# Patient Record
Sex: FEMALE | URBAN - METROPOLITAN AREA
[De-identification: names, ages, dates, MRNs, and addresses within clinical notes are randomized per-mention and may not be internally consistent; named-entity substitution may affect disease eponyms.]

---

## 2018-07-10 ENCOUNTER — APPOINTMENT (RX ONLY)
Dept: URBAN - METROPOLITAN AREA CLINIC 26 | Facility: CLINIC | Age: 38
Setting detail: DERMATOLOGY
End: 2018-07-10

## 2018-07-10 DIAGNOSIS — L57.8 OTHER SKIN CHANGES DUE TO CHRONIC EXPOSURE TO NONIONIZING RADIATION: ICD-10-CM

## 2018-07-10 DIAGNOSIS — L81.4 OTHER MELANIN HYPERPIGMENTATION: ICD-10-CM

## 2018-07-10 DIAGNOSIS — D18.0 HEMANGIOMA: ICD-10-CM

## 2018-07-10 DIAGNOSIS — Z80.8 FAMILY HISTORY OF MALIGNANT NEOPLASM OF OTHER ORGANS OR SYSTEMS: ICD-10-CM

## 2018-07-10 DIAGNOSIS — D22 MELANOCYTIC NEVI: ICD-10-CM

## 2018-07-10 DIAGNOSIS — D485 NEOPLASM OF UNCERTAIN BEHAVIOR OF SKIN: ICD-10-CM

## 2018-07-10 PROBLEM — D22.5 MELANOCYTIC NEVI OF TRUNK: Status: ACTIVE | Noted: 2018-07-10

## 2018-07-10 PROBLEM — D18.01 HEMANGIOMA OF SKIN AND SUBCUTANEOUS TISSUE: Status: ACTIVE | Noted: 2018-07-10

## 2018-07-10 PROBLEM — D48.5 NEOPLASM OF UNCERTAIN BEHAVIOR OF SKIN: Status: ACTIVE | Noted: 2018-07-10

## 2018-07-10 PROCEDURE — ? COUNSELING

## 2018-07-10 PROCEDURE — ? BIOPSY BY SHAVE METHOD

## 2018-07-10 PROCEDURE — 11100: CPT

## 2018-07-10 PROCEDURE — 99214 OFFICE O/P EST MOD 30 MIN: CPT | Mod: 25

## 2018-07-10 ASSESSMENT — LOCATION ZONE DERM
LOCATION ZONE: TRUNK
LOCATION ZONE: ARM

## 2018-07-10 ASSESSMENT — LOCATION SIMPLE DESCRIPTION DERM
LOCATION SIMPLE: RIGHT FOREARM
LOCATION SIMPLE: LEFT FOREARM
LOCATION SIMPLE: ABDOMEN

## 2018-07-10 ASSESSMENT — LOCATION DETAILED DESCRIPTION DERM
LOCATION DETAILED: EPIGASTRIC SKIN
LOCATION DETAILED: RIGHT DISTAL RADIAL DORSAL FOREARM
LOCATION DETAILED: LEFT VENTRAL PROXIMAL FOREARM
LOCATION DETAILED: PERIUMBILICAL SKIN
LOCATION DETAILED: RIGHT VENTRAL PROXIMAL FOREARM

## 2018-07-10 NOTE — PROCEDURE: BIOPSY BY SHAVE METHOD
Electrodesiccation Text: The wound bed was treated with electrodesiccation after the biopsy was performed.
Anesthesia Type: 1% lidocaine with epinephrine
Cryotherapy Text: The wound bed was treated with cryotherapy after the biopsy was performed.
Bill For Surgical Tray: no
Detail Level: Detailed
Electrodesiccation And Curettage Text: The wound bed was treated with electrodesiccation and curettage after the biopsy was performed.
Anesthesia Volume In Cc (Will Not Render If 0): 0.5
Dressing: bandage
Biopsy Method: 15 blade
Consent: Written consent was obtained and risks were reviewed including but not limited to scarring, infection, bleeding, scabbing, incomplete removal, nerve damage and allergy to anesthesia.
Billing Type: Third-Party Bill
Post-Care Instructions: I reviewed with the patient in detail post-care instructions. Patient is to keep the biopsy site dry overnight, and then apply bacitracin twice daily until healed. Patient may apply hydrogen peroxide soaks to remove any crusting.
Type Of Destruction Used: Curettage
Notification Instructions: Patient will be notified of biopsy results. However, patient instructed to call the office if not contacted within 2 weeks.
X Size Of Lesion In Cm: 0
Biopsy Type: H and E
Silver Nitrate Text: The wound bed was treated with silver nitrate after the biopsy was performed.
Was A Bandage Applied: Yes
Wound Care: Petrolatum
Curettage Text: The wound bed was treated with curettage after the biopsy was performed.
Depth Of Biopsy: dermis
Hemostasis: Drysol

## 2021-01-25 ENCOUNTER — APPOINTMENT (EMERGENCY)
Dept: RADIOLOGY | Facility: HOSPITAL | Age: 41
End: 2021-01-25
Attending: EMERGENCY MEDICINE
Payer: COMMERCIAL

## 2021-01-25 ENCOUNTER — HOSPITAL ENCOUNTER (EMERGENCY)
Facility: HOSPITAL | Age: 41
Discharge: HOME | End: 2021-01-25
Attending: EMERGENCY MEDICINE
Payer: COMMERCIAL

## 2021-01-25 ENCOUNTER — APPOINTMENT (EMERGENCY)
Dept: RADIOLOGY | Facility: HOSPITAL | Age: 41
End: 2021-01-25
Payer: COMMERCIAL

## 2021-01-25 VITALS
DIASTOLIC BLOOD PRESSURE: 52 MMHG | OXYGEN SATURATION: 98 % | RESPIRATION RATE: 19 BRPM | HEART RATE: 87 BPM | WEIGHT: 175 LBS | TEMPERATURE: 97.7 F | HEIGHT: 70 IN | SYSTOLIC BLOOD PRESSURE: 148 MMHG | BODY MASS INDEX: 25.05 KG/M2

## 2021-01-25 DIAGNOSIS — R79.89 ABNORMAL LFTS: ICD-10-CM

## 2021-01-25 DIAGNOSIS — R11.2 NAUSEA AND VOMITING, INTRACTABILITY OF VOMITING NOT SPECIFIED, UNSPECIFIED VOMITING TYPE: ICD-10-CM

## 2021-01-25 DIAGNOSIS — K76.0 FATTY LIVER: ICD-10-CM

## 2021-01-25 DIAGNOSIS — E80.6 DIRECT HYPERBILIRUBINEMIA: ICD-10-CM

## 2021-01-25 DIAGNOSIS — E83.42 HYPOMAGNESEMIA: Primary | ICD-10-CM

## 2021-01-25 DIAGNOSIS — D72.829 LEUKOCYTOSIS, UNSPECIFIED TYPE: ICD-10-CM

## 2021-01-25 LAB
ALBUMIN SERPL-MCNC: 3.5 G/DL (ref 3.4–5)
ALP SERPL-CCNC: 171 IU/L (ref 35–126)
ALT SERPL-CCNC: 29 IU/L (ref 11–54)
ANION GAP SERPL CALC-SCNC: 15 MEQ/L (ref 3–15)
AST SERPL-CCNC: 120 IU/L (ref 15–41)
B-HCG UR QL: NEGATIVE
BACTERIA URNS QL MICRO: 1 /HPF
BASOPHILS # BLD: 0.12 K/UL (ref 0.01–0.1)
BASOPHILS NFR BLD: 0.8 %
BILIRUB DIRECT SERPL-MCNC: 2.2 MG/DL
BILIRUB SERPL-MCNC: 5.5 MG/DL (ref 0.3–1.2)
BILIRUB UR QL STRIP.AUTO: 1 MG/DL
BUN SERPL-MCNC: 7 MG/DL (ref 8–20)
CALCIUM SERPL-MCNC: 9.3 MG/DL (ref 8.9–10.3)
CHLORIDE SERPL-SCNC: 95 MEQ/L (ref 98–109)
CLARITY UR REFRACT.AUTO: CLEAR
CO2 SERPL-SCNC: 27 MEQ/L (ref 22–32)
COLOR UR AUTO: ABNORMAL
CREAT SERPL-MCNC: 0.8 MG/DL (ref 0.6–1.1)
DIFFERENTIAL METHOD BLD: ABNORMAL
EOSINOPHIL # BLD: 0.07 K/UL (ref 0.04–0.36)
EOSINOPHIL NFR BLD: 0.5 %
ERYTHROCYTE [DISTWIDTH] IN BLOOD BY AUTOMATED COUNT: 15.9 % (ref 11.7–14.4)
GFR SERPL CREATININE-BSD FRML MDRD: >60 ML/MIN/1.73M*2
GLUCOSE SERPL-MCNC: 135 MG/DL (ref 70–99)
GLUCOSE UR STRIP.AUTO-MCNC: NEGATIVE MG/DL
HCT VFR BLDCO AUTO: 43.3 % (ref 35–45)
HGB BLD-MCNC: 14.9 G/DL (ref 11.8–15.7)
HGB UR QL STRIP.AUTO: NEGATIVE
HYALINE CASTS #/AREA URNS LPF: ABNORMAL /LPF
IMM GRANULOCYTES # BLD AUTO: 0.03 K/UL (ref 0–0.08)
IMM GRANULOCYTES NFR BLD AUTO: 0.2 %
KETONES UR STRIP.AUTO-MCNC: NEGATIVE MG/DL
LEUKOCYTE ESTERASE UR QL STRIP.AUTO: NEGATIVE
LIPASE SERPL-CCNC: 23 U/L (ref 20–51)
LYMPHOCYTES # BLD: 3.56 K/UL (ref 1.2–3.5)
LYMPHOCYTES NFR BLD: 23.5 %
MAGNESIUM SERPL-MCNC: 1.2 MG/DL (ref 1.8–2.5)
MCH RBC QN AUTO: 35.7 PG (ref 28–33.2)
MCHC RBC AUTO-ENTMCNC: 34.4 G/DL (ref 32.2–35.5)
MCV RBC AUTO: 103.8 FL (ref 83–98)
MONOCYTES # BLD: 1.46 K/UL (ref 0.28–0.8)
MONOCYTES NFR BLD: 9.6 %
NEUTROPHILS # BLD: 9.9 K/UL (ref 1.7–7)
NEUTS SEG NFR BLD: 65.4 %
NITRITE UR QL STRIP.AUTO: NEGATIVE
NRBC BLD-RTO: 0 %
PDW BLD AUTO: 11.6 FL (ref 9.4–12.3)
PH UR STRIP.AUTO: 8.5 [PH]
PLATELET # BLD AUTO: 142 K/UL (ref 150–369)
POCT TEST: NORMAL
POTASSIUM SERPL-SCNC: 3.5 MEQ/L (ref 3.6–5.1)
PROT SERPL-MCNC: 8.1 G/DL (ref 6–8.2)
PROT UR QL STRIP.AUTO: 1
RBC # BLD AUTO: 4.17 M/UL (ref 3.93–5.22)
RBC #/AREA URNS HPF: ABNORMAL /HPF
SODIUM SERPL-SCNC: 137 MEQ/L (ref 136–144)
SP GR UR REFRACT.AUTO: >1.035
SQUAMOUS URNS QL MICRO: 1 /HPF
UROBILINOGEN UR STRIP-ACNC: 2 EU/DL
WBC # BLD AUTO: 15.14 K/UL (ref 3.8–10.5)
WBC #/AREA URNS HPF: ABNORMAL /HPF

## 2021-01-25 PROCEDURE — 74177 CT ABD & PELVIS W/CONTRAST: CPT | Mod: MG

## 2021-01-25 PROCEDURE — 85025 COMPLETE CBC W/AUTO DIFF WBC: CPT

## 2021-01-25 PROCEDURE — G1004 CDSM NDSC: HCPCS

## 2021-01-25 PROCEDURE — 83690 ASSAY OF LIPASE: CPT | Performed by: EMERGENCY MEDICINE

## 2021-01-25 PROCEDURE — 83690 ASSAY OF LIPASE: CPT

## 2021-01-25 PROCEDURE — 63600000 HC DRUGS/DETAIL CODE: Performed by: PHYSICIAN ASSISTANT

## 2021-01-25 PROCEDURE — 96361 HYDRATE IV INFUSION ADD-ON: CPT

## 2021-01-25 PROCEDURE — 99284 EMERGENCY DEPT VISIT MOD MDM: CPT | Mod: 25

## 2021-01-25 PROCEDURE — 80053 COMPREHEN METABOLIC PANEL: CPT | Performed by: EMERGENCY MEDICINE

## 2021-01-25 PROCEDURE — 76705 ECHO EXAM OF ABDOMEN: CPT

## 2021-01-25 PROCEDURE — 82248 BILIRUBIN DIRECT: CPT | Performed by: PHYSICIAN ASSISTANT

## 2021-01-25 PROCEDURE — A9585 GADOBUTROL INJECTION: HCPCS | Performed by: PHYSICIAN ASSISTANT

## 2021-01-25 PROCEDURE — 3E033GC INTRODUCTION OF OTHER THERAPEUTIC SUBSTANCE INTO PERIPHERAL VEIN, PERCUTANEOUS APPROACH: ICD-10-PCS | Performed by: EMERGENCY MEDICINE

## 2021-01-25 PROCEDURE — 63600105 HC IODINE BASED CONTRAST: Mod: JW | Performed by: PHYSICIAN ASSISTANT

## 2021-01-25 PROCEDURE — 85025 COMPLETE CBC W/AUTO DIFF WBC: CPT | Performed by: EMERGENCY MEDICINE

## 2021-01-25 PROCEDURE — 83735 ASSAY OF MAGNESIUM: CPT | Performed by: PHYSICIAN ASSISTANT

## 2021-01-25 PROCEDURE — A9579 GAD-BASE MR CONTRAST NOS,1ML: HCPCS | Performed by: PHYSICIAN ASSISTANT

## 2021-01-25 PROCEDURE — 25000000 HC PHARMACY GENERAL: Performed by: PHYSICIAN ASSISTANT

## 2021-01-25 PROCEDURE — 96375 TX/PRO/DX INJ NEW DRUG ADDON: CPT

## 2021-01-25 PROCEDURE — 96365 THER/PROPH/DIAG IV INF INIT: CPT

## 2021-01-25 PROCEDURE — 81001 URINALYSIS AUTO W/SCOPE: CPT | Performed by: EMERGENCY MEDICINE

## 2021-01-25 PROCEDURE — 36415 COLL VENOUS BLD VENIPUNCTURE: CPT

## 2021-01-25 PROCEDURE — 96366 THER/PROPH/DIAG IV INF ADDON: CPT

## 2021-01-25 PROCEDURE — 25800000 HC PHARMACY IV SOLUTIONS: Performed by: PHYSICIAN ASSISTANT

## 2021-01-25 PROCEDURE — 80053 COMPREHEN METABOLIC PANEL: CPT

## 2021-01-25 PROCEDURE — 3E0337Z INTRODUCTION OF ELECTROLYTIC AND WATER BALANCE SUBSTANCE INTO PERIPHERAL VEIN, PERCUTANEOUS APPROACH: ICD-10-PCS | Performed by: EMERGENCY MEDICINE

## 2021-01-25 RX ORDER — SIMVASTATIN 20 MG/1
20 TABLET, FILM COATED ORAL
COMMUNITY
Start: 2020-12-20 | End: 2024-11-23 | Stop reason: ALTCHOICE

## 2021-01-25 RX ORDER — METOPROLOL SUCCINATE 50 MG/1
50 TABLET, EXTENDED RELEASE ORAL
COMMUNITY
Start: 2020-12-20

## 2021-01-25 RX ORDER — CETIRIZINE HYDROCHLORIDE 10 MG/1
10 TABLET ORAL DAILY
COMMUNITY

## 2021-01-25 RX ORDER — FAMOTIDINE 10 MG/ML
20 INJECTION INTRAVENOUS ONCE
Status: COMPLETED | OUTPATIENT
Start: 2021-01-25 | End: 2021-01-25

## 2021-01-25 RX ORDER — GADOBUTROL 604.72 MG/ML
8 INJECTION INTRAVENOUS ONCE
Status: COMPLETED | OUTPATIENT
Start: 2021-01-25 | End: 2021-01-25

## 2021-01-25 RX ORDER — ONDANSETRON HYDROCHLORIDE 2 MG/ML
4 INJECTION, SOLUTION INTRAVENOUS ONCE
Status: COMPLETED | OUTPATIENT
Start: 2021-01-25 | End: 2021-01-25

## 2021-01-25 RX ADMIN — IOHEXOL 80 ML: 350 INJECTION, SOLUTION INTRAVENOUS at 11:20

## 2021-01-25 RX ADMIN — FAMOTIDINE 20 MG: 10 INJECTION INTRAVENOUS at 08:38

## 2021-01-25 RX ADMIN — GADOBUTROL 8 ML: 604.72 INJECTION INTRAVENOUS at 18:35

## 2021-01-25 RX ADMIN — MAGNESIUM SULFATE 2 G: 2 INJECTION INTRAVENOUS at 09:46

## 2021-01-25 RX ADMIN — ONDANSETRON HYDROCHLORIDE 4 MG: 2 SOLUTION INTRAMUSCULAR; INTRAVENOUS at 08:38

## 2021-01-25 RX ADMIN — SODIUM CHLORIDE 1000 ML: 9 INJECTION, SOLUTION INTRAVENOUS at 08:37

## 2021-01-25 SDOH — HEALTH STABILITY: MENTAL HEALTH: HOW OFTEN DO YOU HAVE A DRINK CONTAINING ALCOHOL?: 2-3 TIMES A WEEK

## 2021-01-25 ASSESSMENT — ENCOUNTER SYMPTOMS
DIAPHORESIS: 1
CHILLS: 1
LIGHT-HEADEDNESS: 0
SHORTNESS OF BREATH: 0
WEAKNESS: 0
DIARRHEA: 1
VOMITING: 1
HEMATURIA: 0
DYSURIA: 0
CONSTIPATION: 0
COUGH: 0
FEVER: 0
COLOR CHANGE: 0
DIZZINESS: 0
ABDOMINAL PAIN: 1
HEADACHES: 1
NAUSEA: 1
FREQUENCY: 0

## 2021-01-25 NOTE — ED PROVIDER NOTES
HPI     Chief Complaint   Patient presents with   • Nausea   • Vomiting   • Diarrhea   • Abdominal Pain       41-year-old female with PMH of HTN, HL, and IBS presents emergency department for nausea, vomiting, and diarrhea x1 day.  Patient states that the nausea, vomiting, diarrhea started at 11 AM yesterday.  Patient states that the diarrhea resolved approximately in 2 hours but the nausea vomiting persist.  Patient admits to approximately 25 episodes of vomiting over the last day.  Patient denies any melena emesis, hematochezia, or melena.  Patient states she has had similar symptoms in the past when she is had an IBS flare but it has never been as severe.  Patient has been unable to tolerate anything p.o.  Patient admits to abdominal discomfort just superior to the umbilicus that started after the nausea vomiting and feels as if there is a rock there.  Patient states the pain currently is resolved.  Patient denies any diarrhea, constipation, hematochezia, melena, dysuria, urinary frequency, urinary urgency, or hematuria.  Patient denies any chest pain, shortness of breath, lightheadedness, dizziness, or fevers.  Patient does admit to intermittent diaphoresis and chills over the last day.  Patient admits to current generalized headache that started after the nausea and vomiting.  Patient states that similar to headache she has had in the past.  Patient admits to drinking 2 alcoholic beverages approximately 4-5 times a week.      History provided by:  Patient   used: No    Vomiting  Associated symptoms: abdominal pain, chills, diarrhea and headaches    Associated symptoms: no cough and no fever    Diarrhea  Associated symptoms: abdominal pain, chills, diaphoresis, headaches and vomiting    Associated symptoms: no fever    Abdominal Pain  Associated symptoms: chills, diarrhea, nausea and vomiting    Associated symptoms: no chest pain, no constipation, no cough, no dysuria, no fever, no hematuria  and no shortness of breath         Patient History     Past Medical History:   Diagnosis Date   • Hypertension    • Lipid disorder        History reviewed. No pertinent surgical history.    History reviewed. No pertinent family history.    Social History     Tobacco Use   • Smoking status: Former Smoker   • Smokeless tobacco: Never Used   Substance Use Topics   • Alcohol use: Not Currently     Frequency: 2-3 times a week   • Drug use: Not Currently       Systems Reviewed from Nursing Triage:          Review of Systems     Review of Systems   Constitutional: Positive for chills and diaphoresis. Negative for fever.   HENT: Negative for nosebleeds.    Eyes: Negative for visual disturbance.   Respiratory: Negative for cough and shortness of breath.    Cardiovascular: Negative for chest pain.   Gastrointestinal: Positive for abdominal pain, diarrhea, nausea and vomiting. Negative for constipation.   Genitourinary: Negative for dysuria, frequency and hematuria.   Skin: Negative for color change and rash.   Neurological: Positive for headaches. Negative for dizziness, syncope, weakness and light-headedness.   All other systems reviewed and are negative.       Physical Exam     ED Vitals    Date/Time Temp Pulse Resp BP SpO2 Who   01/25/21 2129 -- 87 19 148/52 98 % MTM   01/25/21 2000 -- 83 18 149/62 100 % LK   01/25/21 1700 -- 75 16 131/61 97 % HH   01/25/21 1700 -- 76 16 131/61 97 % [facility]   01/25/21 1403 -- -- 17 148/65 97 % [facility]   01/25/21 1120 -- 88 16 140/73 98 % [facility]   01/25/21 1007 -- 82 18 144/68 97 % [facility]   01/25/21 0845 -- 77 19 143/67 97 % [facility]   01/25/21 0728 -- 88 17 124/62 98 % [facility]   01/25/21 0621 36.5 °C (97.7 °F) 91 16 121/57 96 % MM          Pulse Ox %: 96 % (01/25/21 0728)  Pulse Ox Interpretation: Normal (01/25/21 0728)  Heart Rate: 91 (01/25/21 0728)  Rhythm Strip Interpretation: Normal Sinus Rhythm (01/25/21 0728)                                       Physical Exam  Vitals signs and nursing note reviewed.    Constitutional:       General: She is not in acute distress.     Appearance: She is well-developed. She is obese. She is not ill-appearing or diaphoretic.   HENT:      Head: Normocephalic and atraumatic.   Eyes:      Extraocular Movements: Extraocular movements intact.      Conjunctiva/sclera: Conjunctivae normal.   Neck:      Musculoskeletal: Normal range of motion.   Cardiovascular:      Rate and Rhythm: Normal rate and regular rhythm.      Heart sounds: Normal heart sounds.   Pulmonary:      Effort: Pulmonary effort is normal. No respiratory distress.      Breath sounds: Normal breath sounds. No decreased breath sounds, wheezing, rhonchi or rales.   Abdominal:      General: Abdomen is flat. Bowel sounds are normal.      Palpations: Abdomen is soft.      Tenderness: There is no abdominal tenderness. There is no right CVA tenderness, left CVA tenderness, guarding or rebound.   Musculoskeletal: Normal range of motion.   Skin:     General: Skin is warm and dry.      Capillary Refill: Capillary refill takes less than 2 seconds.   Neurological:      Mental Status: She is alert.              Procedures    Results     Procedure Component Value Units Date/Time    UA with reflex culture [[id number]]  (Abnormal) Collected: 01/25/21 2123    Specimen: Urine, Clean Catch Updated: 01/25/21 2228    Narrative:      The following orders were created for panel order UA with reflex culture.  Procedure                               Abnormality         Status                     ---------                               -----------         ------                     UA Reflex to Culture (Ma...[[id number]]  Abnormal            Final result               UA Microscopic[[id number]]               Abnormal            Final result                 Please view results for these tests on the individual orders.    UA Microscopic [[id number]]  (Abnormal) Collected: 01/25/21 2123    Specimen: Urine, Clean Catch Updated: 01/25/21 2228     Squamous  Epithelial +1 /hpf      Bacteria, Urine +1 /HPF      RBC, Urine 0 TO 4 /HPF      WBC, Urine 0 TO 3 /HPF      Hyaline Casts 0 TO 2 /lpf     UA Reflex to Culture (Macroscopic) [[id number]]  (Abnormal) Collected: 01/25/21 2123    Specimen: Urine, Clean Catch Updated: 01/25/21 2155     Color, Urine Cassandra     Clarity, Urine Clear     Specific Gravity, Urine >1.035     pH, Urine 8.5     Leukocyte Esterase Negative     Comment: Results can be falsely negative due to high specific gravity, some antibiotics, glucose >3 g/dl, or WBC other than neutrophils.        Nitrite, Urine Negative     Protein, Urine +1     Comment: Urinary pH above 8.0 may cause false positive protein.        Glucose, Urine Negative mg/dL      Ketones, Urine Negative mg/dL      Urobilinogen, Urine 2.0 EU/dL      Bilirubin, Urine +1 mg/dL      Blood, Urine Negative     Comment: The sensitivity of the occult blood test is equivalent to approximately 4 intact RBC/HPF.       Magnesium [[id number]]  (Abnormal) Collected: 01/25/21 0657    Specimen: Blood, Venous Updated: 01/25/21 0902     Magnesium 1.2 mg/dL     Bilirubin, direct [[id number]]  (Abnormal) Collected: 01/25/21 0657    Specimen: Blood, Venous Updated: 01/25/21 0902     Bilirubin, Direct 2.2 mg/dL     CBC and differential [[id number]]  (Abnormal) Collected: 01/25/21 0657    Specimen: Blood, Venous Updated: 01/25/21 0755     WBC 15.14 K/uL      RBC 4.17 M/uL      Hemoglobin 14.9 g/dL      Hematocrit 43.3 %      .8 fL      MCH 35.7 pg      MCHC 34.4 g/dL      RDW 15.9 %      Platelets 142 K/uL      MPV 11.6 fL      Differential Type Auto     nRBC 0.0 %      Immature Granulocytes 0.2 %      Neutrophils 65.4 %      Lymphocytes 23.5 %      Monocytes 9.6 %      Eosinophils 0.5 %      Basophils 0.8 %      Immature Granulocytes, Absolute 0.03 K/uL      Neutrophils, Absolute 9.90 K/uL      Lymphocytes, Absolute 3.56 K/uL      Monocytes, Absolute 1.46 K/uL      Eosinophils, Absolute 0.07 K/uL       Basophils, Absolute 0.12 K/uL     Comprehensive metabolic panel [[id number]]  (Abnormal) Collected: 01/25/21 0657    Specimen: Blood, Venous Updated: 01/25/21 0746     Sodium 137 mEQ/L      Potassium 3.5 mEQ/L      Comment: Results obtained on plasma. Plasma Potassium values may be up to 0.4 mEQ/L less than serum values. The differences may be greater for patients with high platelet or white cell counts.        Chloride 95 mEQ/L      CO2 27 mEQ/L      BUN 7 mg/dL      Creatinine 0.8 mg/dL      Glucose 135 mg/dL      Calcium 9.3 mg/dL      AST (SGOT) 120 IU/L      ALT (SGPT) 29 IU/L      Alkaline Phosphatase 171 IU/L      Total Protein 8.1 g/dL      Comment: Test performed on plasma which typically contains approximately 0.4 g/dL more protein than serum.        Albumin 3.5 g/dL      Bilirubin, Total 5.5 mg/dL      eGFR >60.0 mL/min/1.73m*2      Anion Gap 15 mEQ/L     Lipase [[id number]]  (Normal) Collected: 01/25/21 0657    Specimen: Blood, Venous Updated: 01/25/21 0746     Lipase 23 U/L           Imaging Results          MRI ABDOMEN WITH AND WITHOUT CONTRAST (Final result)  Result time 01/25/21 21:03:11   Procedure changed from MRI ABDOMEN WITHOUT CONTRAST     Final result                 Impression:    IMPRESSION: Limited study.  Hepatomegaly, heterogeneous hepatic enhancement, with masslike areas of hepatic  steatosis..    COMMENT: MRI of the abdomen was performed utilizing axial T1, fat-suppressed T1,  T2, and fat-suppressed T2-weighted sequences. These are supplemented by axial  T1-weighted gradient echo sequences in and out of phase and coronal T2 weighted  sequences. Axial diffusion sequences were also performed. Following the  intravenous injection of 8 cc of Gadavist, axial fat-suppressed T1-weighted  sequences through the abdomen were performed in the arterial and portal venous  phases and at approximately 3 minutes. A delayed post-contrast fat-suppressed  coronal T1 weighted sequence was also performed.   Study is markedly limited by  motion.    Comparison: Ultrasound of the right upper quadrant and CT scan the abdomen from  earlier in the day.    The lung bases appear grossly unremarkable.  The heart does not appear enlarged.    The liver is enlarged measuring at least 23.3 cm in craniocaudal dimension.  The  signal throughout the liver is mildly heterogeneous, particularly on the  postcontrast images which may be due to hepatic steatosis, steatohepatitis,  and/or fibrosis.  In addition, there are large, masslike areas of signal loss on  the opposed phase sequence particularly in the lateral segment of the left lobe,  posterior aspect of the medial segment of the left lobe, inferior aspect of the  right lobe, and caudate lobe, which also demonstrate signal loss on the  fat-suppressed T1 and fat-suppressed T2-weighted sequences and are mildly bright  on the fat-suppressed post of thick sequences.  These do not restrict diffusion.  There are normal vessels coursing through these areas.  No definite abnormal  enhancement is seen in these areas.  These are felt to represent masslike areas  of hepatic steatosis.    The gallbladder is grossly unremarkable.  No biliary dilatation is seen.    The spleen, pancreas, kidneys, and adrenal glands appear unremarkable..    No upper abdominal lymphadenopathy or free fluid is seen.  No grossly abnormal  bowel loops are identified.    The signal in the osseous structures is grossly unremarkable.             Narrative:    CLINICAL HISTORY: Abnormal liver function tests; abnormal ultrasound and CT                               CT ABDOMEN PELVIS WITH IV CONTRAST (Final result)  Result time 01/25/21 11:57:08    Final result                 Impression:    IMPRESSION:  The liver is markedly enlarged and heterogeneous in echotexture demonstrating  two large poorly defined masslike areas in the left hepatic lobe and right  hepatic lobe but without distortion of the underlying  vasculature.  Additionally, no intrahepatic ductal dilatation is noted.  Recommend 3T MRI  liver for definitive characterization, favor atypical appearance of hepatic  steatosis although other etiologies are not excluded.        Finding:    Other   Acuity: Significant  Status:  CLOSED    Critical read back was performed and results were read back by MARBELLA WALKER,  1/25/2021 11:56 AM.                   Narrative:      CLINICAL HISTORY: Epigastric pain, elevated bilirubin    COMMENT:  TECHNIQUE:  Axial images were obtained through the abdomen and pelvis after IV  and oral without contrast.  Sagittal and coronal reconstructions were performed.      Comparison: Ultrasound performed earlier the same day  CT DOSE:  One or more dose reduction techniques (e.g. automated exposure  control, adjustment of the mA and/or kV according to patient size, use of  iterative reconstruction technique) utilized for this examination.    The lung bases are clear.  There are no pleural effusions.  The liver is  markedly abnormal, enlarged and demonstrating several large poorly defined  masses.  No intrahepatic ductal dilatation is noted.  The spleen, gallbladder,  pancreas, small and large bowel are unremarkable.  The adrenal glands are normal  in size.  Both kidneys enhance symmetrically.  There is no hydronephrosis.  No  free fluid or free air is seen.  Small nevaeh hepatic lymph nodes are seen.  No  enlarged adenopathy is noted.    Multiple subcutaneous calcified nodules are noted.  Uterus is present with an  IUD.  Small right ovarian follicle noted..  Bladder is normal.  The vascular and  osseous structures are intact.                               ULTRASOUND GALLBLADDER (Final result)  Result time 01/25/21 10:47:38    Final result                 Impression:    IMPRESSION:      1.  No evidence for cholelithiasis, acute cholecystitis,  or biliary ductal  dilatation.  Mild gallbladder sludge.    2.  Hepatomegaly and heterogeneity of  the hepatic parenchyma, a finding which  can be seen in the setting of diffuse hepatic steatosis.  Due to the  heterogeneity, I would recommend further assessment with contrast-enhanced CT or  MR to exclude any underlying mass lesions.    3.  Incidental note is made of debris within the stomach.  The patient reports  that she did not eat recently.  This can be seen with gastric obstruction.  Further assessment with CT as clinically appropriate.             Narrative:      CLINICAL HISTORY:  Nausea/vomiting-- elevated bilirubin    COMMENT: A limited abdominal ultrasound with attention to the right upper  quadrant is performed.  Grayscale and color Doppler imaging is utilized.    COMPARISON: There are no prior examinations available for comparison.    Liver: The liver is enlarged.  The liver measures 24 cm in length.  There is  diffusely increased parenchymal echogenicity of finding which is most commonly  seen in the setting of hepatic steatosis.  The hepatic parenchyma is also  slightly heterogeneous, a finding that can be seen with certain types of hepatic  steatosis.  It would be difficult to entirely exclude a mass lesion.  Further  assessment with cross-sectional imaging recommended.    Biliary tree:  There is no intra or extra hepatic biliary duct dilatation with  the common bile duct measuring 4 mm.    Gallbladder:  No gallstones are seen within the gallbladder.  Minimal  gallbladder sludge.  There is no gallbladder wall thickening or pericholecystic  fluid identified. A positive sonographic Birch's sign is not elicited.    Pancreas:   The pancreatic head and body are normal.  The tail is suboptimally  visualized secondary to overlying bowel gas.    Right Kidney:  The right kidney measures 12.1 cm in length.  The kidney is  normal in echogenicity.  There is no evidence for hydronephrosis or renal  calculi.    Additional findings: Incidental note is made of debris within the stomach.  The  patient reports that  she did not eat recently.  This can be seen with gastric  obstruction.  Further assessment with CT as clinically appropriate.                                No orders to display               ED Course & MDM     MDM  Number of Diagnoses or Management Options  Diagnosis management comments: 41-year-old female with PMH of HTN, HL, and IBS presents emergency department for nausea, vomiting, and diarrhea x1 day.  Differential diagnosis is broad.  Will get basic labs, lipase, urine pregnancy, and UA.  Will give IV fluids, Zofran, and Pepcid then reevaluate.       Amount and/or Complexity of Data Reviewed  Clinical lab tests: reviewed             ED Course as of Jan 26 0717   Mon Jan 25, 2021   0841 WBC(!): 15.14 [KM]   0842 No previous for comparison.  Will get direct bilirubin level.   Bilirubin, Total(!): 5.5 [KM]   0842 Alkaline Phosphatase(!): 171 [KM]   0843 AST (SGOT)(!): 120 [KM]   0843 AST elevated.  Alk phos slightly elevated.  Total bilirubin elevated.  Will get ultrasound of gallbladder for further evaluation.  Abdomen nontender to palpation.    [KM]   0915 Bilirubin, Direct(!): 2.2 [KM]   0915 Will replete   Magnesium(!): 1.2 [KM]   0917 GI paged for consult    [KM]   0925 Spoke with GI.  Agree with the plan for ultrasound of gallbladder.  If it does not reveal anything acute will get CT abdomen/pelvis for further evaluation.    [KM]   1055 US of gallbladder reveals No evidence for cholelithiasis, acute cholecystitis,  or biliary ductal dilatation.  Mild gallbladder sludge. Hepatomegaly and heterogeneity of the hepatic parenchyma, a finding which can be seen in the setting of diffuse hepatic steatosis.  Incidental note is made of debris within the stomach.  The patient reports that she did not eat recently.  This can be seen with gastric obstruction.  Will get CT of abdomen/pelvis for further evaluation.    [KM]   1158 Attending discussed CT results with Dr. Collins recommended MRI for further evaluation.     [KM]   1533 CRITICAL CARE NOTE:    15:33  I have personally provided 45 minutes of critical care time exclusive of time spent on separately billable procedures. Time includes review of all data, discussion with consultants / admitting physicians, re-evaluation of patient, discussion with patient and/or family, review of all results, and monitoring for potential decompensation.    Critical hypomagnesium req IV replacement.       [TL]   1922 Patient signed out to [facility] pending MRI results. Will replete magnesium    [KM]   2030 Awaiting Mri result; I called rads attending, and he'll get to it as soon as he can. Unfortunately he has many critical studies to review first.    [JF]   2105 Pt refusd additional labs;  Per rads, no mass, but mass-like fatty linfiltration;  No ductal dilitaoint;    Rewvied w/ GI--okc'd for dc home; pt eager to leave; comfortable w/ plan    Educated re:cutting down etoh    Pt dc'd for close out-pt management. Doubt acute emergency at this time. Conditions for which immediate rted reviewed.          [JF]      ED Course User Index  [JF] Kevin Mcqueen MD  [KM] Keri Medina PA C  [TL] Phan Milton, DO         Clinical Impressions as of Jan 26 0717   Hypomagnesemia   Abnormal LFTs   Fatty liver   Direct hyperbilirubinemia   Leukocytosis, unspecified type   Nausea and vomiting, intractability of vomiting not specified, unspecified vomiting type        Keri Medina PA C  01/26/21 0717

## 2021-01-26 NOTE — DISCHARGE INSTRUCTIONS
RETURN IMMEDIATELY FOR ANY NEW OR WORSE SYMPTOMS;    REVIEW ANY LABS AND FINAL IMAGING RESULTS (SOMETIMES THESE MAY NOT BE BACK UNTIL TOMORROW)  WITH YOUR DOCTORS AT YOUR NEXT APPOINTMENT.    Arrange repeat liver enzymes for 1 week.    As discussed talk to your doctor about getting help to reduce your alcohol consumption.  This is very   important    Ask your gynecologist about whether you can still be on a Mirena, in light of your abnormal liver enzymes.  Discussed this with him or her this week.

## 2021-02-10 ENCOUNTER — APPOINTMENT (RX ONLY)
Dept: URBAN - METROPOLITAN AREA CLINIC 26 | Facility: CLINIC | Age: 41
Setting detail: DERMATOLOGY
End: 2021-02-10

## 2021-02-10 DIAGNOSIS — I78.8 OTHER DISEASES OF CAPILLARIES: ICD-10-CM

## 2021-02-10 DIAGNOSIS — I78.1 NEVUS, NON-NEOPLASTIC: ICD-10-CM

## 2021-02-10 DIAGNOSIS — L81.4 OTHER MELANIN HYPERPIGMENTATION: ICD-10-CM

## 2021-02-10 DIAGNOSIS — D22 MELANOCYTIC NEVI: ICD-10-CM

## 2021-02-10 PROBLEM — D48.5 NEOPLASM OF UNCERTAIN BEHAVIOR OF SKIN: Status: ACTIVE | Noted: 2021-02-10

## 2021-02-10 PROBLEM — D22.71 MELANOCYTIC NEVI OF RIGHT LOWER LIMB, INCLUDING HIP: Status: ACTIVE | Noted: 2021-02-10

## 2021-02-10 PROBLEM — D22.72 MELANOCYTIC NEVI OF LEFT LOWER LIMB, INCLUDING HIP: Status: ACTIVE | Noted: 2021-02-10

## 2021-02-10 PROCEDURE — ? COUNSELING

## 2021-02-10 PROCEDURE — ? PRESCRIPTION MEDICATION MANAGEMENT

## 2021-02-10 PROCEDURE — ? ADDITIONAL NOTES

## 2021-02-10 PROCEDURE — ? FULL BODY SKIN EXAM

## 2021-02-10 PROCEDURE — 99213 OFFICE O/P EST LOW 20 MIN: CPT

## 2021-02-10 PROCEDURE — ? TREATMENT REGIMEN

## 2021-02-10 ASSESSMENT — LOCATION SIMPLE DESCRIPTION DERM
LOCATION SIMPLE: ABDOMEN
LOCATION SIMPLE: LEFT CLAVICULAR SKIN
LOCATION SIMPLE: RIGHT PRETIBIAL REGION
LOCATION SIMPLE: LEFT FOREHEAD
LOCATION SIMPLE: LEFT PRETIBIAL REGION

## 2021-02-10 ASSESSMENT — LOCATION ZONE DERM
LOCATION ZONE: TRUNK
LOCATION ZONE: LEG
LOCATION ZONE: FACE

## 2021-02-10 ASSESSMENT — LOCATION DETAILED DESCRIPTION DERM
LOCATION DETAILED: EPIGASTRIC SKIN
LOCATION DETAILED: LEFT PROXIMAL PRETIBIAL REGION
LOCATION DETAILED: LEFT FOREHEAD
LOCATION DETAILED: RIGHT PROXIMAL PRETIBIAL REGION
LOCATION DETAILED: LEFT CLAVICULAR SKIN

## 2021-02-10 NOTE — PROCEDURE: ADDITIONAL NOTES
Detail Level: Simple
Additional Notes: Patient consent was obtained to proceed with the visit and recommended plan of care after discussion of all risks and benefits, including the risks of COVID-19 exposure.
Detail Level: Zone
Additional Notes: Discussed this can be related to issues with the liver. Pt is seeing GI and pcp and they are getting blood work done for her and evaluating so she is aware of the potential connection
Render Risk Assessment In Note?: no

## 2021-02-10 NOTE — HPI: EVALUATION OF SKIN LESION(S)
What Type Of Note Output Would You Prefer (Optional)?: Bullet Format
Hpi Title: Evaluation of Skin Lesions
Family Member: Father and Grandfather

## 2021-02-10 NOTE — PROCEDURE: PRESCRIPTION MEDICATION MANAGEMENT
Detail Level: Zone
Plan: Discussed laser
Render In Strict Bullet Format?: No
Samples Given: Rhofade qam to try

## 2022-04-28 ENCOUNTER — APPOINTMENT (RX ONLY)
Dept: URBAN - METROPOLITAN AREA CLINIC 26 | Facility: CLINIC | Age: 42
Setting detail: DERMATOLOGY
End: 2022-04-28

## 2022-04-28 DIAGNOSIS — L63.8 OTHER ALOPECIA AREATA: ICD-10-CM

## 2022-04-28 PROCEDURE — ? COUNSELING

## 2022-04-28 PROCEDURE — ? TREATMENT REGIMEN

## 2022-04-28 PROCEDURE — ? PRESCRIPTION

## 2022-04-28 PROCEDURE — ? PHOTO-DOCUMENTATION

## 2022-04-28 PROCEDURE — ? ADDITIONAL NOTES

## 2022-04-28 PROCEDURE — ? INTRALESIONAL KENALOG

## 2022-04-28 PROCEDURE — 99213 OFFICE O/P EST LOW 20 MIN: CPT | Mod: 25

## 2022-04-28 PROCEDURE — 11900 INJECT SKIN LESIONS </W 7: CPT

## 2022-04-28 PROCEDURE — ? OBSERVATION AND MEASURE

## 2022-04-28 RX ORDER — CLOBETASOL PROPIONATE 0.5 MG/ML
SOLUTION TOPICAL
Qty: 50 | Refills: 1 | Status: ERX | COMMUNITY
Start: 2022-04-28

## 2022-04-28 RX ADMIN — CLOBETASOL PROPIONATE: 0.5 SOLUTION TOPICAL at 00:00

## 2022-04-28 ASSESSMENT — LOCATION ZONE DERM: LOCATION ZONE: SCALP

## 2022-04-28 ASSESSMENT — LOCATION DETAILED DESCRIPTION DERM: LOCATION DETAILED: MID-OCCIPITAL SCALP

## 2022-04-28 ASSESSMENT — LOCATION SIMPLE DESCRIPTION DERM: LOCATION SIMPLE: POSTERIOR SCALP

## 2022-04-28 NOTE — HPI: HAIR LOSS
Previous Labs: No
How Did The Hair Loss Occur?: sudden in onset
What Hair Products Do You Use?: Dry shampoo, hairspray shampoo and conditioner no changes in years on brands.

## 2022-04-28 NOTE — PROCEDURE: TREATMENT REGIMEN
Detail Level: Zone
Initiate Treatment: clobetasol 0.05 % scalp solution: Apply to affected areas of scalp BID for up to 2 weeks per month.\\nStarting ILK treatment

## 2022-04-28 NOTE — PROCEDURE: INTRALESIONAL KENALOG
Concentration Of Solution Injected (Mg/Ml): 7.0
Medical Necessity Clause: This procedure was medically necessary because the lesions that were treated were:
Consent: The risks of atrophy were reviewed with the patient.
Validate Note Data When Using Inventory: Yes
Detail Level: Detailed
X Size Of Lesion In Cm (Optional): 0
Total Volume Injected (Ccs- Only Use Numbers And Decimals): 2.3
Administered By (Optional): Fredi Jarvis PA-C
Include Z78.9 (Other Specified Conditions Influencing Health Status) As An Associated Diagnosis?: No
Treatment Number (Optional): 1
Size Of Lesion (Optional): 5
Kenalog Preparation: Kenalog with normal saline

## 2022-05-26 ENCOUNTER — APPOINTMENT (RX ONLY)
Dept: URBAN - METROPOLITAN AREA CLINIC 26 | Facility: CLINIC | Age: 42
Setting detail: DERMATOLOGY
End: 2022-05-26

## 2022-05-26 DIAGNOSIS — L63.8 OTHER ALOPECIA AREATA: ICD-10-CM | Status: STABLE

## 2022-05-26 PROCEDURE — ? PRESCRIPTION MEDICATION MANAGEMENT

## 2022-05-26 PROCEDURE — ? INTRALESIONAL KENALOG

## 2022-05-26 PROCEDURE — ? COUNSELING

## 2022-05-26 PROCEDURE — 11900 INJECT SKIN LESIONS </W 7: CPT

## 2022-05-26 PROCEDURE — ? ADDITIONAL NOTES

## 2022-05-26 ASSESSMENT — LOCATION DETAILED DESCRIPTION DERM: LOCATION DETAILED: MID-OCCIPITAL SCALP

## 2022-05-26 ASSESSMENT — LOCATION SIMPLE DESCRIPTION DERM: LOCATION SIMPLE: POSTERIOR SCALP

## 2022-05-26 ASSESSMENT — LOCATION ZONE DERM: LOCATION ZONE: SCALP

## 2022-05-26 NOTE — PROCEDURE: INTRALESIONAL KENALOG
Concentration Of Solution Injected (Mg/Ml): 5.0
Medical Necessity Clause: This procedure was medically necessary because the lesions that were treated were:
Consent: The risks of atrophy were reviewed with the patient.
Validate Note Data When Using Inventory: Yes
Detail Level: Detailed
X Size Of Lesion In Cm (Optional): 0
Total Volume Injected (Ccs- Only Use Numbers And Decimals): 1.0
Administered By (Optional): Fredi Jarvis PA-C
Include Z78.9 (Other Specified Conditions Influencing Health Status) As An Associated Diagnosis?: No
Treatment Number (Optional): 2
Size Of Lesion (Optional): 5
Kenalog Preparation: Kenalog

## 2022-05-26 NOTE — PROCEDURE: PRESCRIPTION MEDICATION MANAGEMENT
Detail Level: Simple
Render In Strict Bullet Format?: No
Continue Regimen: Clobetasol solution BID 3 days per week.

## 2022-06-29 ENCOUNTER — APPOINTMENT (RX ONLY)
Dept: URBAN - METROPOLITAN AREA CLINIC 26 | Facility: CLINIC | Age: 42
Setting detail: DERMATOLOGY
End: 2022-06-29

## 2022-06-29 VITALS — WEIGHT: 192 LBS | HEIGHT: 70 IN

## 2022-06-29 DIAGNOSIS — L63.8 OTHER ALOPECIA AREATA: ICD-10-CM

## 2022-06-29 PROCEDURE — ? ADDITIONAL NOTES

## 2022-06-29 PROCEDURE — ? OTHER

## 2022-06-29 PROCEDURE — 99214 OFFICE O/P EST MOD 30 MIN: CPT

## 2022-06-29 PROCEDURE — ? COUNSELING

## 2022-06-29 PROCEDURE — ? PRESCRIPTION MEDICATION MANAGEMENT

## 2022-06-29 PROCEDURE — ? PRESCRIPTION

## 2022-06-29 RX ORDER — PREDNISONE 5 MG/1
TABLET ORAL
Qty: 62 | Refills: 0 | Status: CANCELLED

## 2022-06-29 RX ORDER — PREDNISONE 10 MG/1
TABLET ORAL
Qty: 62 | Refills: 0 | Status: CANCELLED | COMMUNITY
Start: 2022-06-29

## 2022-06-29 RX ADMIN — PREDNISONE: 10 TABLET ORAL at 00:00

## 2022-06-29 ASSESSMENT — LOCATION DETAILED DESCRIPTION DERM
LOCATION DETAILED: LEFT MEDIAL FRONTAL SCALP
LOCATION DETAILED: LEFT SUPERIOR PARIETAL SCALP

## 2022-06-29 ASSESSMENT — LOCATION ZONE DERM: LOCATION ZONE: SCALP

## 2022-06-29 ASSESSMENT — LOCATION SIMPLE DESCRIPTION DERM
LOCATION SIMPLE: LEFT SCALP
LOCATION SIMPLE: SCALP

## 2022-06-29 NOTE — PROCEDURE: PRESCRIPTION MEDICATION MANAGEMENT
Detail Level: Simple
Render In Strict Bullet Format?: No
Initiate Treatment: prednisone 5 mg tablet: Take 5 pills qday for four days, take 4 pills qday for four days, take 3 pills qday for four days, take 2 pills qday for four days, 1 pill qday for four days, then take half pill qday for four days.
Continue Regimen: Clobetasol solution BID 3 days per week.

## 2022-06-30 RX ORDER — PREDNISONE 10 MG/1
TABLET ORAL
Qty: 62 | Refills: 0 | Status: ERX | COMMUNITY
Start: 2022-06-30

## 2022-06-30 RX ADMIN — PREDNISONE: 10 TABLET ORAL at 00:00

## 2022-08-10 ENCOUNTER — APPOINTMENT (RX ONLY)
Dept: URBAN - METROPOLITAN AREA CLINIC 26 | Facility: CLINIC | Age: 42
Setting detail: DERMATOLOGY
End: 2022-08-10

## 2022-08-10 DIAGNOSIS — L63.8 OTHER ALOPECIA AREATA: ICD-10-CM

## 2022-08-10 PROCEDURE — ? PRESCRIPTION

## 2022-08-10 PROCEDURE — ? COUNSELING

## 2022-08-10 PROCEDURE — ? INTRALESIONAL KENALOG

## 2022-08-10 PROCEDURE — ? ADDITIONAL NOTES

## 2022-08-10 PROCEDURE — 11900 INJECT SKIN LESIONS </W 7: CPT

## 2022-08-10 PROCEDURE — ? TREATMENT REGIMEN

## 2022-08-10 RX ORDER — CLOBETASOL PROPIONATE 0.5 MG/ML
SOLUTION TOPICAL
Qty: 50 | Refills: 2 | Status: ERX

## 2022-08-10 ASSESSMENT — LOCATION DETAILED DESCRIPTION DERM
LOCATION DETAILED: LEFT MEDIAL FRONTAL SCALP
LOCATION DETAILED: RIGHT OCCIPITAL SCALP
LOCATION DETAILED: RIGHT INFERIOR OCCIPITAL SCALP
LOCATION DETAILED: LEFT OCCIPITAL SCALP
LOCATION DETAILED: LEFT INFERIOR OCCIPITAL SCALP
LOCATION DETAILED: LEFT SUPERIOR PARIETAL SCALP

## 2022-08-10 ASSESSMENT — LOCATION ZONE DERM: LOCATION ZONE: SCALP

## 2022-08-10 ASSESSMENT — LOCATION SIMPLE DESCRIPTION DERM
LOCATION SIMPLE: POSTERIOR SCALP
LOCATION SIMPLE: LEFT SCALP
LOCATION SIMPLE: SCALP

## 2022-08-10 NOTE — PROCEDURE: INTRALESIONAL KENALOG
X Size Of Lesion In Cm (Optional): 0
Detail Level: Detailed
Administered By (Optional): Fredi Jarvis PA-C
Medical Necessity Clause: This procedure was medically necessary because the lesions that were treated were:
Validate Note Data When Using Inventory: Yes
Include Z78.9 (Other Specified Conditions Influencing Health Status) As An Associated Diagnosis?: No
Concentration Of Solution Injected (Mg/Ml): 5.0
Kenalog Preparation: Kenalog
Total Volume Injected (Ccs- Only Use Numbers And Decimals): 0.5
Consent: The risks of atrophy were reviewed with the patient.

## 2022-08-10 NOTE — PROCEDURE: TREATMENT REGIMEN
Detail Level: Zone
Otc Regimen: Minoxidil 5% solution (not pregnant or planning pregnancy or breastfeeding)
Continue Regimen: Clobetasol scalp solution BID x3 days per week.

## 2022-09-08 ENCOUNTER — APPOINTMENT (RX ONLY)
Dept: URBAN - METROPOLITAN AREA CLINIC 26 | Facility: CLINIC | Age: 42
Setting detail: DERMATOLOGY
End: 2022-09-08

## 2022-09-08 DIAGNOSIS — L82.1 OTHER SEBORRHEIC KERATOSIS: ICD-10-CM

## 2022-09-08 DIAGNOSIS — L63.8 OTHER ALOPECIA AREATA: ICD-10-CM

## 2022-09-08 PROCEDURE — 99212 OFFICE O/P EST SF 10 MIN: CPT | Mod: 25

## 2022-09-08 PROCEDURE — ? ADDITIONAL NOTES

## 2022-09-08 PROCEDURE — 11900 INJECT SKIN LESIONS </W 7: CPT

## 2022-09-08 PROCEDURE — ? SKIN MEDICINALS

## 2022-09-08 PROCEDURE — ? INTRALESIONAL KENALOG

## 2022-09-08 PROCEDURE — ? MEDICATION COUNSELING

## 2022-09-08 PROCEDURE — ? COUNSELING

## 2022-09-08 PROCEDURE — ? TREATMENT REGIMEN

## 2022-09-08 ASSESSMENT — LOCATION ZONE DERM
LOCATION ZONE: ARM
LOCATION ZONE: SCALP

## 2022-09-08 ASSESSMENT — LOCATION SIMPLE DESCRIPTION DERM
LOCATION SIMPLE: POSTERIOR SCALP
LOCATION SIMPLE: LEFT SHOULDER
LOCATION SIMPLE: SCALP
LOCATION SIMPLE: LEFT SCALP

## 2022-09-08 ASSESSMENT — LOCATION DETAILED DESCRIPTION DERM
LOCATION DETAILED: LEFT INFERIOR OCCIPITAL SCALP
LOCATION DETAILED: RIGHT INFERIOR OCCIPITAL SCALP
LOCATION DETAILED: LEFT ANTERIOR SHOULDER
LOCATION DETAILED: LEFT OCCIPITAL SCALP
LOCATION DETAILED: LEFT SUPERIOR PARIETAL SCALP
LOCATION DETAILED: RIGHT OCCIPITAL SCALP
LOCATION DETAILED: LEFT MEDIAL FRONTAL SCALP

## 2022-09-08 NOTE — PROCEDURE: SKIN MEDICINALS
Sig: Apply pea sized amount per area at night
Sig: Apply to affected areas twice daily
Sig: Apply to affected areas on face twice daily
Sig: Take one twice daily
Sig: Apply nightly to warts nightly under occlusion
Sig: Apply a thin layer to the affected areas twice daily
Sig: Apply twice daily for 5 days
Sig: Apply a thin layer to the itching areas twice daily as needed
Sig: Apply a thin layer to the affected areas daily
Christophe Inhibitor Medicines: Tofacitinib 2%, Niacinamide 2% Cream
Sig: Apply a thin layer to the painful areas twice daily as needed
Sig: Apply twice daily for 4-7 days to scalp and lateral cheeks, hands, forearms, and posterior neck.
Sig: Apply a thin layer to the affected skin twice daily
Sig: Wash affected areas daily.
Sig: Apply a thin layer to the scar daily
Intro Statement: I recommended the following products:
Sig: Apply pea sized amount per area at night x2-3 months as needed then discontinue.
Detail Level: Simple
Product Type (1): SANTOS Inhibitor

## 2022-09-08 NOTE — PROCEDURE: TREATMENT REGIMEN
Detail Level: Zone
Initiate Treatment: Skin medicinals SANTOS inhibitor topical BID.
Discontinue Regimen: Clobetasol scalp solution BID x3 days per week.

## 2022-09-08 NOTE — HPI: SKIN LESION
What Type Of Note Output Would You Prefer (Optional)?: Bullet Format
Is This A New Presentation, Or A Follow-Up?: Skin Lesion
Which Family Member (Optional)?: Dad , grandfather

## 2022-10-12 ENCOUNTER — APPOINTMENT (RX ONLY)
Dept: URBAN - METROPOLITAN AREA CLINIC 26 | Facility: CLINIC | Age: 42
Setting detail: DERMATOLOGY
End: 2022-10-12

## 2022-10-12 DIAGNOSIS — B07.8 OTHER VIRAL WARTS: ICD-10-CM

## 2022-10-12 DIAGNOSIS — L63.8 OTHER ALOPECIA AREATA: ICD-10-CM | Status: IMPROVED

## 2022-10-12 PROCEDURE — 11900 INJECT SKIN LESIONS </W 7: CPT | Mod: 59

## 2022-10-12 PROCEDURE — 17110 DESTRUCTION B9 LES UP TO 14: CPT

## 2022-10-12 PROCEDURE — ? INTRALESIONAL KENALOG

## 2022-10-12 PROCEDURE — ? MEDICATION COUNSELING

## 2022-10-12 PROCEDURE — ? LIQUID NITROGEN

## 2022-10-12 PROCEDURE — ? COUNSELING

## 2022-10-12 PROCEDURE — ? TREATMENT REGIMEN

## 2022-10-12 ASSESSMENT — LOCATION DETAILED DESCRIPTION DERM
LOCATION DETAILED: LEFT ANTERIOR SHOULDER
LOCATION DETAILED: RIGHT OCCIPITAL SCALP
LOCATION DETAILED: LEFT INFERIOR OCCIPITAL SCALP
LOCATION DETAILED: LEFT MEDIAL FRONTAL SCALP
LOCATION DETAILED: LEFT OCCIPITAL SCALP
LOCATION DETAILED: RIGHT INFERIOR OCCIPITAL SCALP
LOCATION DETAILED: LEFT SUPERIOR PARIETAL SCALP

## 2022-10-12 ASSESSMENT — LOCATION SIMPLE DESCRIPTION DERM
LOCATION SIMPLE: LEFT SHOULDER
LOCATION SIMPLE: POSTERIOR SCALP
LOCATION SIMPLE: LEFT SCALP
LOCATION SIMPLE: SCALP

## 2022-10-12 ASSESSMENT — LOCATION ZONE DERM
LOCATION ZONE: ARM
LOCATION ZONE: SCALP

## 2022-10-12 NOTE — PROCEDURE: MEDICATION COUNSELING
cardiac arrest/critical patient/respiratory failure emergency venous access/volume resuscitation Griseofulvin Pregnancy And Lactation Text: This medication is Pregnancy Category X and is known to cause serious birth defects. It is unknown if this medication is excreted in breast milk but breast feeding should be avoided.

## 2022-10-12 NOTE — PROCEDURE: MEDICATION COUNSELING
Yoana (spouse) called last week stating that even though they had approval in past, [employer] was still willing to cover cost of Repatha because they could not afford it.  Yoana stated that she made several phone calls to determine that they are still unable to afford this med and requesting assistance to move forward with obtaining Repatha for free.  Call placed to [facility].  Faxed letter to [facility] (attn: Rebekah) per recommendation from Alexia.  Awaiting return response.     Adbry Pregnancy And Lactation Text: It is unknown if this medication will adversely affect pregnancy or breast feeding.

## 2022-10-12 NOTE — PROCEDURE: TREATMENT REGIMEN
Detail Level: Zone
Plan: Previously treated with clobetasol
Continue Regimen: Skin medicinals SANTOS inhibitor topical BID.

## 2022-10-12 NOTE — PROCEDURE: LIQUID NITROGEN
Spray Paint Technique: No
Show Spray Paint Technique Variable?: Yes
Number Of Freeze-Thaw Cycles: 2 freeze-thaw cycles
Medical Necessity Clause: This procedure was medically necessary because the lesions that were treated were:
Medical Necessity Information: It is in your best interest to select a reason for this procedure from the list below. All of these items fulfill various CMS LCD requirements except the new and changing color options.
Spray Paint Text: The liquid nitrogen was applied to the skin utilizing a spray paint frosting technique.
Post-Care Instructions: I reviewed with the patient in detail post-care instructions. Patient is to wear sunprotection, and avoid picking at any of the treated lesions. Pt may apply Vaseline to crusted or scabbing areas.
Detail Level: Detailed
Consent: The patient's consent was obtained including but not limited to risks of crusting, scabbing, blistering, scarring, darker or lighter pigmentary change, recurrence, incomplete removal and infection.

## 2022-11-09 ENCOUNTER — APPOINTMENT (RX ONLY)
Dept: URBAN - METROPOLITAN AREA CLINIC 26 | Facility: CLINIC | Age: 42
Setting detail: DERMATOLOGY
End: 2022-11-09

## 2022-11-09 DIAGNOSIS — L63.8 OTHER ALOPECIA AREATA: ICD-10-CM

## 2022-11-09 PROCEDURE — ? INTRALESIONAL KENALOG

## 2022-11-09 PROCEDURE — ? MEDICATION COUNSELING

## 2022-11-09 PROCEDURE — 11900 INJECT SKIN LESIONS </W 7: CPT

## 2022-11-09 PROCEDURE — ? OTHER

## 2022-11-09 PROCEDURE — ? ORDER TESTS

## 2022-11-09 PROCEDURE — ? TREATMENT REGIMEN

## 2022-11-09 PROCEDURE — ? COUNSELING

## 2022-11-09 ASSESSMENT — LOCATION SIMPLE DESCRIPTION DERM
LOCATION SIMPLE: SCALP
LOCATION SIMPLE: LEFT SCALP
LOCATION SIMPLE: POSTERIOR SCALP

## 2022-11-09 ASSESSMENT — LOCATION DETAILED DESCRIPTION DERM
LOCATION DETAILED: RIGHT INFERIOR OCCIPITAL SCALP
LOCATION DETAILED: LEFT SUPERIOR PARIETAL SCALP
LOCATION DETAILED: MID-OCCIPITAL SCALP
LOCATION DETAILED: LEFT MEDIAL FRONTAL SCALP
LOCATION DETAILED: LEFT OCCIPITAL SCALP
LOCATION DETAILED: LEFT INFERIOR OCCIPITAL SCALP
LOCATION DETAILED: RIGHT OCCIPITAL SCALP

## 2022-11-09 ASSESSMENT — LOCATION ZONE DERM: LOCATION ZONE: SCALP

## 2022-11-09 NOTE — PROCEDURE: OTHER
Detail Level: Zone
Render Risk Assessment In Note?: no
Note Text (......Xxx Chief Complaint.): This diagnosis correlates with the
Other (Free Text): Patient has noted diffuse hair shedding recently. Notes significant stress at work. Favor telogen effluvium. Will obtain labs.

## 2022-11-09 NOTE — PROCEDURE: MEDICATION COUNSELING
Home Tranexamic Acid Counseling:  Patient advised of the small risk of bleeding problems with tranexamic acid. They were also instructed to call if they developed any nausea, vomiting or diarrhea. All of the patient's questions and concerns were addressed.

## 2022-11-09 NOTE — PROCEDURE: MEDICATION COUNSELING
Before Your Surgery      Call your surgeon if there is any change in your health. This includes signs of a cold or flu (such as a sore throat, runny nose, cough, rash or fever).    Do not smoke, drink alcohol or take over the counter medicine (unless your surgeon or primary care doctor tells you to) for the 24 hours before and after surgery.    If you take prescribed drugs: Follow your doctor s orders about which medicines to take and which to stop until after surgery.    Eating and drinking prior to surgery: follow the instructions from your surgeon    Take a shower or bath the night before surgery. Use the soap your surgeon gave you to gently clean your skin. If you do not have soap from your surgeon, use your regular soap. Do not shave or scrub the surgery site.  Wear clean pajamas and have clean sheets on your bed.    Drysol Counseling:  I discussed with the patient the risks of drysol/aluminum chloride including but not limited to skin rash, itching, irritation, burning.

## 2022-11-09 NOTE — PROCEDURE: ORDER TESTS
Bill For Surgical Tray: no
Expected Date Of Service: 11/09/2022
Billing Type: Third-Party Bill
Performing Laboratory: [id number]

## 2022-11-09 NOTE — PROCEDURE: INTRALESIONAL KENALOG
How Many Mls Were Removed From The 40 Mg/Ml (10ml) Vial When Preparing The Injectable Solution?: 0
Validate Note Data When Using Inventory: Yes
Include Z78.9 (Other Specified Conditions Influencing Health Status) As An Associated Diagnosis?: No
Total Volume Injected (Ccs- Only Use Numbers And Decimals): 0.6
Concentration Of Solution Injected (Mg/Ml): 5.0
Treatment Number (Optional): 7
Detail Level: Detailed
Kenalog Preparation: Kenalog with normal saline
Consent: The risks of atrophy were reviewed with the patient.
Medical Necessity Clause: This procedure was medically necessary because the lesions that were treated were:
Administered By (Optional): Fredi Jarvis PA-C

## 2022-12-14 ENCOUNTER — APPOINTMENT (RX ONLY)
Dept: URBAN - METROPOLITAN AREA CLINIC 23 | Facility: CLINIC | Age: 42
Setting detail: DERMATOLOGY
End: 2022-12-14

## 2022-12-14 DIAGNOSIS — L63.8 OTHER ALOPECIA AREATA: ICD-10-CM

## 2022-12-14 PROCEDURE — 11900 INJECT SKIN LESIONS </W 7: CPT

## 2022-12-14 PROCEDURE — ? PRESCRIPTION MEDICATION MANAGEMENT

## 2022-12-14 PROCEDURE — ? COUNSELING

## 2022-12-14 PROCEDURE — ? INTRALESIONAL KENALOG

## 2022-12-14 ASSESSMENT — LOCATION SIMPLE DESCRIPTION DERM: LOCATION SIMPLE: POSTERIOR SCALP

## 2022-12-14 ASSESSMENT — LOCATION ZONE DERM: LOCATION ZONE: SCALP

## 2022-12-14 ASSESSMENT — LOCATION DETAILED DESCRIPTION DERM: LOCATION DETAILED: MID-OCCIPITAL SCALP

## 2022-12-14 NOTE — PROCEDURE: PRESCRIPTION MEDICATION MANAGEMENT
Render In Strict Bullet Format?: No
Plan: Reviewed bloodwork with patient. She saw results and scheduled a follow up with her PCP to go over. Reports she has had high ferritin and iron saturation for years, states she’s seen multiple specialists and no one has been able to give a definitive diagnosis. Reports “various symptoms over the years.” Reports she had unexplained elevated LFT’s for a few years that has improved, but now seems to be increasing. Would like blood work sent to PCP before follow up appointment with them. \\n\\nDiscussed thyroid results were normal.
Detail Level: Simple
Continue Regimen: skin medicinals tofacitinib 2% cream

## 2022-12-14 NOTE — PROCEDURE: INTRALESIONAL KENALOG
How Many Mls Were Removed From The 10 Mg/Ml (5ml) Vial When Preparing The Injectable Solution?: 0
Medical Necessity Clause: This procedure was medically necessary because the lesions that were treated were:
Total Volume Injected (Ccs- Only Use Numbers And Decimals): 0.3
Detail Level: Detailed
Administered By (Optional): Adrien Tesfaye PA-C
Kenalog Preparation: Kenalog
Include Z78.9 (Other Specified Conditions Influencing Health Status) As An Associated Diagnosis?: No
Concentration Of Solution Injected (Mg/Ml): 5.0
Consent: The risks of atrophy were reviewed with the patient.
Validate Note Data When Using Inventory: Yes

## 2023-01-13 ENCOUNTER — TRANSCRIBE ORDERS (OUTPATIENT)
Dept: REGISTRATION | Facility: CLINIC | Age: 43
End: 2023-01-13

## 2023-01-13 ENCOUNTER — HOSPITAL ENCOUNTER (OUTPATIENT)
Dept: RADIOLOGY | Facility: CLINIC | Age: 43
Discharge: HOME | End: 2023-01-13
Attending: PHYSICIAN ASSISTANT
Payer: COMMERCIAL

## 2023-01-13 DIAGNOSIS — Z12.31 ENCOUNTER FOR SCREENING MAMMOGRAM FOR MALIGNANT NEOPLASM OF BREAST: ICD-10-CM

## 2023-01-13 DIAGNOSIS — Z12.31 ENCOUNTER FOR SCREENING MAMMOGRAM FOR MALIGNANT NEOPLASM OF BREAST: Primary | ICD-10-CM

## 2023-01-13 PROCEDURE — 77067 SCR MAMMO BI INCL CAD: CPT

## 2023-01-23 ENCOUNTER — HOSPITAL ENCOUNTER (OUTPATIENT)
Dept: RADIOLOGY | Facility: CLINIC | Age: 43
Discharge: HOME | End: 2023-01-23
Attending: STUDENT IN AN ORGANIZED HEALTH CARE EDUCATION/TRAINING PROGRAM
Payer: COMMERCIAL

## 2023-01-23 DIAGNOSIS — R92.8 ABNORMAL MAMMOGRAM: ICD-10-CM

## 2023-01-23 PROCEDURE — 76642 ULTRASOUND BREAST LIMITED: CPT | Mod: 50

## 2023-01-23 PROCEDURE — G0279 TOMOSYNTHESIS, MAMMO: HCPCS

## 2023-02-21 NOTE — PROCEDURE: MEDICATION COUNSELING
Addended by: JACE ONEIL on: 2/21/2023 11:17 AM     Modules accepted: Orders     Valtrex Pregnancy And Lactation Text: this medication is Pregnancy Category B and is considered safe during pregnancy. This medication is not directly found in breast milk but it's metabolite acyclovir is present.

## 2023-05-30 NOTE — PROCEDURE: MEDICATION COUNSELING
Clofazimine Counseling:  I discussed with the patient the risks of clofazimine including but not limited to skin and eye pigmentation, liver damage, nausea/vomiting, gastrointestinal bleeding and allergy. Low Dose Naltrexone Counseling- I discussed with the patient the potential risks and side effects of low dose naltrexone including but not limited to: more vivid dreams, headaches, nausea, vomiting, abdominal pain, fatigue, dizziness, and anxiety.

## 2023-06-21 ENCOUNTER — APPOINTMENT (RX ONLY)
Dept: URBAN - METROPOLITAN AREA CLINIC 374 | Facility: CLINIC | Age: 43
Setting detail: DERMATOLOGY
End: 2023-06-21

## 2023-06-21 DIAGNOSIS — Z71.89 OTHER SPECIFIED COUNSELING: ICD-10-CM

## 2023-06-21 DIAGNOSIS — L81.4 OTHER MELANIN HYPERPIGMENTATION: ICD-10-CM

## 2023-06-21 DIAGNOSIS — D22 MELANOCYTIC NEVI: ICD-10-CM

## 2023-06-21 DIAGNOSIS — L82.1 OTHER SEBORRHEIC KERATOSIS: ICD-10-CM

## 2023-06-21 DIAGNOSIS — D485 NEOPLASM OF UNCERTAIN BEHAVIOR OF SKIN: ICD-10-CM

## 2023-06-21 DIAGNOSIS — D18.0 HEMANGIOMA: ICD-10-CM

## 2023-06-21 DIAGNOSIS — L63.8 OTHER ALOPECIA AREATA: ICD-10-CM

## 2023-06-21 DIAGNOSIS — I83.9 ASYMPTOMATIC VARICOSE VEINS OF LOWER EXTREMITIES: ICD-10-CM

## 2023-06-21 PROBLEM — I83.93 ASYMPTOMATIC VARICOSE VEINS OF BILATERAL LOWER EXTREMITIES: Status: ACTIVE | Noted: 2023-06-21

## 2023-06-21 PROBLEM — D22.5 MELANOCYTIC NEVI OF TRUNK: Status: ACTIVE | Noted: 2023-06-21

## 2023-06-21 PROBLEM — D22.71 MELANOCYTIC NEVI OF RIGHT LOWER LIMB, INCLUDING HIP: Status: ACTIVE | Noted: 2023-06-21

## 2023-06-21 PROBLEM — D22.72 MELANOCYTIC NEVI OF LEFT LOWER LIMB, INCLUDING HIP: Status: ACTIVE | Noted: 2023-06-21

## 2023-06-21 PROBLEM — D48.5 NEOPLASM OF UNCERTAIN BEHAVIOR OF SKIN: Status: ACTIVE | Noted: 2023-06-21

## 2023-06-21 PROBLEM — D18.01 HEMANGIOMA OF SKIN AND SUBCUTANEOUS TISSUE: Status: ACTIVE | Noted: 2023-06-21

## 2023-06-21 PROBLEM — D22.61 MELANOCYTIC NEVI OF RIGHT UPPER LIMB, INCLUDING SHOULDER: Status: ACTIVE | Noted: 2023-06-21

## 2023-06-21 PROBLEM — D22.62 MELANOCYTIC NEVI OF LEFT UPPER LIMB, INCLUDING SHOULDER: Status: ACTIVE | Noted: 2023-06-21

## 2023-06-21 PROCEDURE — ? PRESCRIPTION MEDICATION MANAGEMENT

## 2023-06-21 PROCEDURE — 11900 INJECT SKIN LESIONS </W 7: CPT | Mod: 59

## 2023-06-21 PROCEDURE — ? FULL BODY SKIN EXAM

## 2023-06-21 PROCEDURE — ? PHOTO-DOCUMENTATION

## 2023-06-21 PROCEDURE — 11301 SHAVE SKIN LESION 0.6-1.0 CM: CPT

## 2023-06-21 PROCEDURE — ? SUNSCREEN RECOMMENDATIONS

## 2023-06-21 PROCEDURE — ? SHAVE REMOVAL

## 2023-06-21 PROCEDURE — ? COUNSELING

## 2023-06-21 PROCEDURE — ? RECOMMENDATIONS

## 2023-06-21 PROCEDURE — ? INTRALESIONAL KENALOG

## 2023-06-21 PROCEDURE — 99213 OFFICE O/P EST LOW 20 MIN: CPT | Mod: 25

## 2023-06-21 ASSESSMENT — LOCATION DETAILED DESCRIPTION DERM
LOCATION DETAILED: INFERIOR THORACIC SPINE
LOCATION DETAILED: LEFT ANTERIOR DISTAL THIGH
LOCATION DETAILED: RIGHT ANTERIOR DISTAL UPPER ARM
LOCATION DETAILED: EPIGASTRIC SKIN
LOCATION DETAILED: RIGHT MEDIAL SUPERIOR CHEST
LOCATION DETAILED: RIGHT PROXIMAL PRETIBIAL REGION
LOCATION DETAILED: RIGHT SUPERIOR MEDIAL UPPER BACK
LOCATION DETAILED: LEFT ANTERIOR PROXIMAL THIGH
LOCATION DETAILED: LEFT PROXIMAL PRETIBIAL REGION
LOCATION DETAILED: RIGHT POSTERIOR SHOULDER
LOCATION DETAILED: LEFT ANTERIOR PROXIMAL UPPER ARM
LOCATION DETAILED: LEFT POSTERIOR SHOULDER
LOCATION DETAILED: LEFT INFERIOR OCCIPITAL SCALP
LOCATION DETAILED: SUPERIOR LUMBAR SPINE
LOCATION DETAILED: RIGHT ANTERIOR PROXIMAL UPPER ARM
LOCATION DETAILED: LEFT SUPERIOR MEDIAL UPPER BACK
LOCATION DETAILED: RIGHT ANTERIOR DISTAL THIGH

## 2023-06-21 ASSESSMENT — LOCATION SIMPLE DESCRIPTION DERM
LOCATION SIMPLE: LEFT UPPER BACK
LOCATION SIMPLE: POSTERIOR SCALP
LOCATION SIMPLE: LEFT SHOULDER
LOCATION SIMPLE: LEFT THIGH
LOCATION SIMPLE: RIGHT THIGH
LOCATION SIMPLE: RIGHT UPPER BACK
LOCATION SIMPLE: LEFT UPPER ARM
LOCATION SIMPLE: LOWER BACK
LOCATION SIMPLE: UPPER BACK
LOCATION SIMPLE: LEFT PRETIBIAL REGION
LOCATION SIMPLE: RIGHT SHOULDER
LOCATION SIMPLE: RIGHT PRETIBIAL REGION
LOCATION SIMPLE: CHEST
LOCATION SIMPLE: RIGHT UPPER ARM
LOCATION SIMPLE: ABDOMEN

## 2023-06-21 ASSESSMENT — LOCATION ZONE DERM
LOCATION ZONE: ARM
LOCATION ZONE: LEG
LOCATION ZONE: SCALP
LOCATION ZONE: TRUNK

## 2023-06-21 NOTE — PROCEDURE: FULL BODY SKIN EXAM
Body Of Note (Please Add Your Own Text Here): monitor annually
Price (Do Not Change): 0.00
Detail Level: Generalized
Instructions: This plan will send the code FBSE to the PM system.  DO NOT or CHANGE the price.

## 2023-06-21 NOTE — PROCEDURE: RECOMMENDATIONS
Detail Level: Zone
Recommendation Preamble: The following recommendations were made during the visit:
Render Risk Assessment In Note?: no
Recommendations (Free Text): Patient will look into possible treatment with Vasculera

## 2023-06-21 NOTE — HPI: EVALUATION OF SKIN LESION(S)
What Type Of Note Output Would You Prefer (Optional)?: Standard Output
Hpi Title: Evaluation of Skin Lesions
How Severe Are Your Spot(S)?: mild
Have Your Spot(S) Been Treated In The Past?: has not been treated
Family Member: Sister, father, grandfather

## 2023-06-21 NOTE — PROCEDURE: PRESCRIPTION MEDICATION MANAGEMENT
Render In Strict Bullet Format?: No
Detail Level: Simple
Discontinue Regimen: skin medicinals tofacitinib 2% cream

## 2023-06-21 NOTE — PROCEDURE: MIPS QUALITY
Quality 226: Preventive Care And Screening: Tobacco Use: Screening And Cessation Intervention: Patient screened for tobacco use, is a smoker AND received Cessation Counseling within measurement period or in the six months prior to the measurement period
Quality 130: Documentation Of Current Medications In The Medical Record: Current Medications Documented
Detail Level: Detailed
Quality 431: Preventive Care And Screening: Unhealthy Alcohol Use - Screening: Patient did not receive brief counseling if identified as an unhealthy alcohol user

## 2023-06-21 NOTE — PROCEDURE: SHAVE REMOVAL
Medical Necessity Information: It is in your best interest to select a reason for this procedure from the list below. All of these items fulfill various CMS LCD requirements except the new and changing color options.
Medical Necessity Clause: This procedure was medically necessary because the lesion that was treated was:
Lab: 6
Lab Facility: 0
Detail Level: Detailed
Was A Bandage Applied: Yes
Size Of Lesion In Cm (Required): 0.6
Depth Of Shave: dermis
Biopsy Method: Dermablade
Anesthesia Type: 1% lidocaine with epinephrine
Hemostasis: Aluminum Chloride and Electrocautery
Wound Care: Petrolatum
Render Path Notes In Note?: No
Consent was obtained from the patient. The risks and benefits to therapy were discussed in detail. Specifically, the risks of infection, scarring, bleeding, prolonged wound healing, incomplete removal, allergy to anesthesia, nerve injury and recurrence were addressed. Prior to the procedure, the treatment site was clearly identified and confirmed by the patient. All components of Universal Protocol/PAUSE Rule completed.
Post-Care Instructions: I reviewed with the patient in detail post-care instructions. Patient is to keep the biopsy site dry overnight, and then apply bacitracin twice daily until healed. Patient may apply hydrogen peroxide soaks to remove any crusting.
Notification Instructions: Patient will be notified of pathology results. However, patient instructed to call the office if not contacted within 2 weeks.
Billing Type: Third-Party Bill

## 2023-06-21 NOTE — PROCEDURE: INTRALESIONAL KENALOG
How Many Mls Were Removed From The 10 Mg/Ml (5ml) Vial When Preparing The Injectable Solution?: 0
Medical Necessity Clause: This procedure was medically necessary because the lesions that were treated were:
Total Volume Injected (Ccs- Only Use Numbers And Decimals): 0.6
Detail Level: Detailed
Administered By (Optional): PARAM CUELLAR
Kenalog Preparation: Kenalog
Include Z78.9 (Other Specified Conditions Influencing Health Status) As An Associated Diagnosis?: No
Concentration Of Solution Injected (Mg/Ml): 3.0
Consent: The risks of atrophy were reviewed with the patient.
Validate Note Data When Using Inventory: Yes

## 2023-06-27 NOTE — PROCEDURE: MEDICATION COUNSELING
14 Isotretinoin Counseling: Patient should get monthly blood tests, not donate blood, not drive at night if vision affected, not share medication, and not undergo elective surgery for 6 months after tx completed. Side effects reviewed, pt to contact office should one occur.

## 2023-08-03 ENCOUNTER — APPOINTMENT (RX ONLY)
Dept: URBAN - METROPOLITAN AREA CLINIC 374 | Facility: CLINIC | Age: 43
Setting detail: DERMATOLOGY
End: 2023-08-03

## 2023-08-03 DIAGNOSIS — L63.8 OTHER ALOPECIA AREATA: ICD-10-CM | Status: WORSENING

## 2023-08-03 PROCEDURE — ? PHOTO-DOCUMENTATION

## 2023-08-03 PROCEDURE — ? INTRALESIONAL KENALOG

## 2023-08-03 PROCEDURE — 11900 INJECT SKIN LESIONS </W 7: CPT

## 2023-08-03 PROCEDURE — ? PRESCRIPTION MEDICATION MANAGEMENT

## 2023-08-03 ASSESSMENT — LOCATION DETAILED DESCRIPTION DERM
LOCATION DETAILED: RIGHT OCCIPITAL SCALP
LOCATION DETAILED: MID-OCCIPITAL SCALP
LOCATION DETAILED: LEFT OCCIPITAL SCALP

## 2023-08-03 ASSESSMENT — LOCATION SIMPLE DESCRIPTION DERM: LOCATION SIMPLE: POSTERIOR SCALP

## 2023-08-03 ASSESSMENT — LOCATION ZONE DERM: LOCATION ZONE: SCALP

## 2023-08-03 NOTE — PROCEDURE: PRESCRIPTION MEDICATION MANAGEMENT
Render In Strict Bullet Format?: No
Detail Level: Zone
Initiate Treatment: [place]: Minoxidil 7%/tretinoin 0.0125%/finasteride 0.1%/latanoprost 0.05% cream: apply thin layer to aa of scalp qday

## 2023-10-04 NOTE — PROCEDURE: MEDICATION COUNSELING
eye  Olumiant Counseling: I discussed with the patient the risks of Olumiant therapy including but not limited to upper respiratory tract infections, shingles, cold sores, and nausea. Live vaccines should be avoided.  This medication has been linked to serious infections; higher rate of mortality; malignancy and lymphoproliferative disorders; major adverse cardiovascular events; thrombosis; gastrointestinal perforations; neutropenia; lymphopenia; anemia; liver enzyme elevations; and lipid elevations.

## 2023-10-05 ENCOUNTER — APPOINTMENT (RX ONLY)
Dept: URBAN - METROPOLITAN AREA CLINIC 374 | Facility: CLINIC | Age: 43
Setting detail: DERMATOLOGY
End: 2023-10-05

## 2023-10-05 DIAGNOSIS — L63.8 OTHER ALOPECIA AREATA: ICD-10-CM | Status: IMPROVED

## 2023-10-05 PROCEDURE — ? INTRALESIONAL KENALOG

## 2023-10-05 PROCEDURE — 11900 INJECT SKIN LESIONS </W 7: CPT

## 2023-10-05 PROCEDURE — ? PRESCRIPTION MEDICATION MANAGEMENT

## 2023-10-05 PROCEDURE — ? PHOTO-DOCUMENTATION

## 2023-10-05 ASSESSMENT — LOCATION DETAILED DESCRIPTION DERM
LOCATION DETAILED: RIGHT OCCIPITAL SCALP
LOCATION DETAILED: LEFT OCCIPITAL SCALP
LOCATION DETAILED: MID-OCCIPITAL SCALP

## 2023-10-05 ASSESSMENT — LOCATION SIMPLE DESCRIPTION DERM: LOCATION SIMPLE: POSTERIOR SCALP

## 2023-10-05 ASSESSMENT — LOCATION ZONE DERM: LOCATION ZONE: SCALP

## 2023-10-05 NOTE — PROCEDURE: PRESCRIPTION MEDICATION MANAGEMENT
Render In Strict Bullet Format?: No
Detail Level: Zone
Continue Regimen: [place]: Minoxidil 7%/tretinoin 0.0125%/finasteride 0.1%/latanoprost 0.05% cream: apply thin layer to aa of scalp qday

## 2023-10-05 NOTE — PROCEDURE: PHOTO-DOCUMENTATION
Photo Preface (Leave Blank If You Do Not Want): Photographs were obtained today
Detail Level: Zone
English

## 2023-12-20 ENCOUNTER — APPOINTMENT (RX ONLY)
Dept: URBAN - METROPOLITAN AREA CLINIC 374 | Facility: CLINIC | Age: 43
Setting detail: DERMATOLOGY
End: 2023-12-20

## 2023-12-20 DIAGNOSIS — L63.8 OTHER ALOPECIA AREATA: ICD-10-CM

## 2023-12-20 PROCEDURE — ? ADDITIONAL NOTES

## 2023-12-20 PROCEDURE — 11900 INJECT SKIN LESIONS </W 7: CPT

## 2023-12-20 PROCEDURE — ? COUNSELING

## 2023-12-20 PROCEDURE — ? INTRALESIONAL KENALOG

## 2023-12-20 PROCEDURE — ? PRESCRIPTION MEDICATION MANAGEMENT

## 2023-12-20 ASSESSMENT — LOCATION SIMPLE DESCRIPTION DERM: LOCATION SIMPLE: POSTERIOR SCALP

## 2023-12-20 ASSESSMENT — LOCATION ZONE DERM: LOCATION ZONE: SCALP

## 2023-12-20 NOTE — PROCEDURE: ADDITIONAL NOTES
Additional Notes: Discussed oral treatment options for alopecia and risks/benefits
Detail Level: Zone
Render Risk Assessment In Note?: yes

## 2023-12-20 NOTE — PROCEDURE: INTRALESIONAL KENALOG
How Many Mls Were Removed From The 10 Mg/Ml (5ml) Vial When Preparing The Injectable Solution?: 0
Medical Necessity Clause: This procedure was medically necessary because the lesions that were treated were:
Expiration Date For Esperanza (Optional): 09/2024
Total Volume (Ccs): 1
Detail Level: Detailed
Administered By (Optional): PARAM CUELLAR
Kenalog Preparation: Kenalog
Include Z78.9 (Other Specified Conditions Influencing Health Status) As An Associated Diagnosis?: No
Concentration Of Kenalog Solution Injected (Mg/Ml): 3.0
Consent: The risks of atrophy were reviewed with the patient.
Lot # For Kenalog (Optional): [id number]
Validate Note Data When Using Inventory: Yes
Ndc# For Kenalog Only: 6743-8197-03
Kenalog Type Of Vial: Single Dose

## 2024-04-17 NOTE — PROCEDURE: OTHER
PACU
Other (Free Text): Will start patient on prednisone due to increasing number of patches and significant emotional stress caused to patient.
Detail Level: Simple
Note Text (......Xxx Chief Complaint.): This diagnosis correlates with the
Render Risk Assessment In Note?: no

## 2024-11-22 ENCOUNTER — TRANSCRIBE ORDERS (OUTPATIENT)
Dept: SCHEDULING | Age: 44
End: 2024-11-22

## 2024-11-22 DIAGNOSIS — R17 UNSPECIFIED JAUNDICE: ICD-10-CM

## 2024-11-22 DIAGNOSIS — K75.9 INFLAMMATORY LIVER DISEASE, UNSPECIFIED: ICD-10-CM

## 2024-11-22 DIAGNOSIS — R10.11 RIGHT UPPER QUADRANT PAIN: ICD-10-CM

## 2024-11-22 DIAGNOSIS — R74.01 ELEVATION OF LEVELS OF LIVER TRANSAMINASE LEVELS: Primary | ICD-10-CM

## 2024-11-23 ENCOUNTER — HOSPITAL ENCOUNTER (INPATIENT)
Facility: HOSPITAL | Age: 44
LOS: 2 days | Discharge: HOME | DRG: 378 | End: 2024-11-25
Attending: EMERGENCY MEDICINE | Admitting: STUDENT IN AN ORGANIZED HEALTH CARE EDUCATION/TRAINING PROGRAM
Payer: COMMERCIAL

## 2024-11-23 ENCOUNTER — APPOINTMENT (EMERGENCY)
Dept: RADIOLOGY | Facility: HOSPITAL | Age: 44
DRG: 378 | End: 2024-11-23
Payer: COMMERCIAL

## 2024-11-23 DIAGNOSIS — R79.89 ELEVATED LFTS: ICD-10-CM

## 2024-11-23 DIAGNOSIS — K92.2 UPPER GI BLEED: ICD-10-CM

## 2024-11-23 DIAGNOSIS — K92.2 GASTROINTESTINAL HEMORRHAGE, UNSPECIFIED GASTROINTESTINAL HEMORRHAGE TYPE: ICD-10-CM

## 2024-11-23 DIAGNOSIS — R17 ELEVATED BILIRUBIN: Primary | ICD-10-CM

## 2024-11-23 PROBLEM — D75.89 MACROCYTOSIS: Status: ACTIVE | Noted: 2024-11-23

## 2024-11-23 PROBLEM — E78.00 HYPERCHOLESTEROLEMIA: Chronic | Status: ACTIVE | Noted: 2024-11-23

## 2024-11-23 PROBLEM — I10 HYPERTENSION: Chronic | Status: ACTIVE | Noted: 2024-11-23

## 2024-11-23 PROBLEM — M06.9 RHEUMATOID ARTHRITIS (CMS/HCC): Chronic | Status: ACTIVE | Noted: 2024-11-23

## 2024-11-23 LAB
ALBUMIN SERPL-MCNC: 4.3 G/DL (ref 3.5–5.7)
ALP SERPL-CCNC: 139 IU/L (ref 34–125)
ALT SERPL-CCNC: 46 IU/L (ref 7–52)
ANION GAP SERPL CALC-SCNC: 13 MEQ/L (ref 3–15)
ANISOCYTOSIS BLD QL SMEAR: ABNORMAL
APTT PPP: 39 SEC (ref 23–35)
AST SERPL-CCNC: 121 IU/L (ref 13–39)
BASOPHILS # BLD: 0.07 K/UL (ref 0.01–0.1)
BASOPHILS NFR BLD: 0.8 %
BILIRUB DIRECT SERPL-MCNC: 0.5 MG/DL
BILIRUB SERPL-MCNC: 3.9 MG/DL (ref 0.3–1.2)
BUN SERPL-MCNC: 12 MG/DL (ref 7–25)
CALCIUM SERPL-MCNC: 9.7 MG/DL (ref 8.6–10.3)
CHLORIDE SERPL-SCNC: 94 MEQ/L (ref 98–107)
CO2 SERPL-SCNC: 26 MEQ/L (ref 21–31)
CREAT SERPL-MCNC: 0.9 MG/DL (ref 0.6–1.2)
DIFFERENTIAL METHOD BLD: ABNORMAL
EGFRCR SERPLBLD CKD-EPI 2021: >60 ML/MIN/1.73M*2
EOSINOPHIL # BLD: 0.09 K/UL (ref 0.04–0.36)
EOSINOPHIL NFR BLD: 1 %
ERYTHROCYTE [DISTWIDTH] IN BLOOD BY AUTOMATED COUNT: 15.3 % (ref 11.7–14.4)
GASTROCULT GAST QL: POSITIVE
GLUCOSE SERPL-MCNC: 167 MG/DL (ref 70–99)
HCG UR QL: NEGATIVE
HCT VFR BLD AUTO: 39.4 % (ref 35–45)
HGB BLD-MCNC: 13.5 G/DL (ref 11.8–15.7)
IMM GRANULOCYTES # BLD AUTO: 0.03 K/UL (ref 0–0.08)
IMM GRANULOCYTES NFR BLD AUTO: 0.3 %
INR PPP: 1.5
LYMPHOCYTES # BLD: 1.8 K/UL (ref 1.2–3.5)
LYMPHOCYTES NFR BLD: 19.3 %
MACROCYTES BLD QL SMEAR: ABNORMAL
MCH RBC QN AUTO: 36 PG (ref 28–33.2)
MCHC RBC AUTO-ENTMCNC: 34.3 G/DL (ref 32.2–35.5)
MCV RBC AUTO: 105.1 FL (ref 83–98)
MONOCYTES # BLD: 1.01 K/UL (ref 0.28–0.8)
MONOCYTES NFR BLD: 10.8 %
NEUTROPHILS # BLD: 6.32 K/UL (ref 1.7–7)
NEUTS SEG NFR BLD: 67.8 %
NRBC BLD-RTO: 0 %
PH GAST: 2 [PH]
PLAT MORPH BLD: NORMAL
PLATELET # BLD AUTO: 113 K/UL (ref 150–369)
PLATELET # BLD EST: ABNORMAL 10*3/UL
PMV BLD AUTO: 11.6 FL (ref 9.4–12.3)
POLYCHROMASIA BLD QL SMEAR: ABNORMAL
POTASSIUM SERPL-SCNC: 4.5 MEQ/L (ref 3.5–5.1)
PROT SERPL-MCNC: 9 G/DL (ref 6–8.2)
PROTHROMBIN TIME: 18.2 SEC (ref 12.2–14.5)
RBC # BLD AUTO: 3.75 M/UL (ref 3.93–5.22)
SODIUM SERPL-SCNC: 133 MEQ/L (ref 136–145)
STOMATOCYTES BLD QL SMEAR: ABNORMAL
WBC # BLD AUTO: 9.32 K/UL (ref 3.8–10.5)

## 2024-11-23 PROCEDURE — 80053 COMPREHEN METABOLIC PANEL: CPT | Performed by: EMERGENCY MEDICINE

## 2024-11-23 PROCEDURE — 21400000 HC ROOM AND CARE CCU/INTERMEDIATE

## 2024-11-23 PROCEDURE — 99223 1ST HOSP IP/OBS HIGH 75: CPT | Performed by: STUDENT IN AN ORGANIZED HEALTH CARE EDUCATION/TRAINING PROGRAM

## 2024-11-23 PROCEDURE — 96365 THER/PROPH/DIAG IV INF INIT: CPT | Mod: 59

## 2024-11-23 PROCEDURE — 74177 CT ABD & PELVIS W/CONTRAST: CPT

## 2024-11-23 PROCEDURE — 85025 COMPLETE CBC W/AUTO DIFF WBC: CPT | Performed by: EMERGENCY MEDICINE

## 2024-11-23 PROCEDURE — 25800000 HC PHARMACY IV SOLUTIONS: Performed by: PHYSICIAN ASSISTANT

## 2024-11-23 PROCEDURE — 63600105 HC IODINE BASED CONTRAST: Mod: JZ | Performed by: PHYSICIAN ASSISTANT

## 2024-11-23 PROCEDURE — 82248 BILIRUBIN DIRECT: CPT | Performed by: PHYSICIAN ASSISTANT

## 2024-11-23 PROCEDURE — 96375 TX/PRO/DX INJ NEW DRUG ADDON: CPT | Mod: 59

## 2024-11-23 PROCEDURE — 99285 EMERGENCY DEPT VISIT HI MDM: CPT | Mod: 25

## 2024-11-23 PROCEDURE — 63600000 HC DRUGS/DETAIL CODE: Mod: JZ | Performed by: PHYSICIAN ASSISTANT

## 2024-11-23 PROCEDURE — 85610 PROTHROMBIN TIME: CPT | Performed by: PHYSICIAN ASSISTANT

## 2024-11-23 PROCEDURE — 82271 OCCULT BLOOD OTHER SOURCES: CPT | Performed by: PHYSICIAN ASSISTANT

## 2024-11-23 PROCEDURE — 85730 THROMBOPLASTIN TIME PARTIAL: CPT | Performed by: PHYSICIAN ASSISTANT

## 2024-11-23 PROCEDURE — 36415 COLL VENOUS BLD VENIPUNCTURE: CPT

## 2024-11-23 PROCEDURE — 84703 CHORIONIC GONADOTROPIN ASSAY: CPT | Performed by: EMERGENCY MEDICINE

## 2024-11-23 RX ORDER — IOPAMIDOL 755 MG/ML
100 INJECTION, SOLUTION INTRAVASCULAR
Status: COMPLETED | OUTPATIENT
Start: 2024-11-23 | End: 2024-11-23

## 2024-11-23 RX ORDER — ROSUVASTATIN CALCIUM 20 MG/1
20 TABLET, COATED ORAL DAILY
COMMUNITY
End: 2024-11-25 | Stop reason: HOSPADM

## 2024-11-23 RX ORDER — ONDANSETRON HYDROCHLORIDE 2 MG/ML
4 INJECTION, SOLUTION INTRAVENOUS ONCE
Status: COMPLETED | OUTPATIENT
Start: 2024-11-23 | End: 2024-11-23

## 2024-11-23 RX ORDER — FLUTICASONE PROPIONATE 50 MCG
1 SPRAY, SUSPENSION (ML) NASAL DAILY PRN
COMMUNITY

## 2024-11-23 RX ORDER — LORAZEPAM 2 MG/1
2 TABLET ORAL EVERY 8 HOURS PRN
COMMUNITY

## 2024-11-23 RX ADMIN — SODIUM CHLORIDE 80 MG: 9 INJECTION, SOLUTION INTRAVENOUS at 18:30

## 2024-11-23 RX ADMIN — ONDANSETRON 4 MG: 2 INJECTION INTRAMUSCULAR; INTRAVENOUS at 17:49

## 2024-11-23 RX ADMIN — IOPAMIDOL 100 ML: 755 INJECTION, SOLUTION INTRAVENOUS at 19:00

## 2024-11-23 ASSESSMENT — COGNITIVE AND FUNCTIONAL STATUS - GENERAL
CLIMB 3 TO 5 STEPS WITH RAILING: 4 - NONE
MOVING TO AND FROM BED TO CHAIR: 4 - NONE
STANDING UP FROM CHAIR USING ARMS: 4 - NONE
DO YOU HAVE SERIOUS DIFFICULTY WALKING OR CLIMBING STAIRS: NO
WALKING IN HOSPITAL ROOM: 4 - NONE

## 2024-11-23 ASSESSMENT — ENCOUNTER SYMPTOMS
CHEST TIGHTNESS: 0
SEIZURES: 0
BLOOD IN STOOL: 1
HEADACHES: 0
CHILLS: 1
WOUND: 0
DIZZINESS: 1
DIARRHEA: 0
FEVER: 0
COLOR CHANGE: 0
NECK PAIN: 0
CONSTIPATION: 1
BACK PAIN: 0
SHORTNESS OF BREATH: 0
SPEECH DIFFICULTY: 0
ABDOMINAL PAIN: 1
WEAKNESS: 0
AGITATION: 0
ACTIVITY CHANGE: 0
DIFFICULTY URINATING: 0
NAUSEA: 0
VOMITING: 0
COUGH: 1
RECTAL PAIN: 0
HEMATOCHEZIA: 1

## 2024-11-23 NOTE — ED PROVIDER NOTES
"HPI   HISTORY OF PRESENT ILLNESS     Patient with past medical history of hypertension, hyperlipidemia, rheumatoid arthritis, not currently on any medication -presents to the emergency department complaining of feeling \"sick for weeks, has lost 21 pounds in the past 2 weeks\"-states that for the last couple days, has had blood in the stool/in toilet, but unsure what color stool is due to the blood.  Does have a history of IBS/constipation.  Patient also states that she started vomiting today, had several episodes, initially brown, now with some blood streaked in.  With some chills, but no noted fevers.  Has been feeling dizzy, and disoriented.  Patient has been seen by her PCP-had blood work done at [facility] -which she was told was abnormal..  Is supposed to be getting more labs done with her rheumatologist.  Does have a GI doctor, with [facility], scheduled for a colonoscopy on December 11, states last EGD/colonoscopy was 10 years ago.  Patient does admit to drinking at least 3 glasses of wine daily, on and off for the past 20 years.  Stopped drinking about 1 week ago.      History provided by:  Patient  Rectal Bleeding  Quality:  Bright red  Amount:  Moderate  Timing:  Intermittent  Context: constipation    Context: not diarrhea and not rectal injury    Similar prior episodes: yes    Relieved by:  None tried  Worsened by:  Nothing  Ineffective treatments:  None tried  Associated symptoms: abdominal pain (occassional) and dizziness    Associated symptoms: no fever, no recent illness and no vomiting          Patient History   PAST HISTORY     Reviewed from Nursing Triage:       Past Medical History:   Diagnosis Date    Hypertension     Lipid disorder        No past surgical history on file.    No family history on file.    Social History     Tobacco Use    Smoking status: Former    Smokeless tobacco: Never   Substance Use Topics    Alcohol use: Not Currently    Drug use: Not Currently         Review of Systems   REVIEW OF " SYSTEMS     Review of Systems   Constitutional:  Positive for chills. Negative for activity change and fever.   Respiratory:  Positive for cough. Negative for chest tightness and shortness of breath.    Cardiovascular:  Negative for chest pain and leg swelling.   Gastrointestinal:  Positive for abdominal pain (occassional), blood in stool, constipation and hematochezia. Negative for diarrhea, nausea, rectal pain and vomiting.   Genitourinary:  Negative for difficulty urinating.   Musculoskeletal:  Negative for back pain and neck pain.   Skin:  Negative for color change and wound.   Neurological:  Positive for dizziness. Negative for seizures, syncope, speech difficulty, weakness and headaches.   Psychiatric/Behavioral:  Negative for agitation and behavioral problems.          VITALS     ED Vitals      Date/Time Temp Pulse Resp BP SpO2 [facility]   11/23/24 2023 36.4 °C (97.6 °F) 67 18 129/58 97 % EM   11/23/24 1915 -- 78 18 144/67 96 % OMB   11/23/24 1833 -- 69 18 141/63 95 % DS   11/23/24 1658 36.3 °C (97.3 °F) -- -- -- -- OMB   11/23/24 1656 -- 72 17 156/89 96 % OMB          Pulse Ox %: 96 % (11/23/24 2017)  Pulse Ox Interpretation: Normal (11/23/24 2017)           Physical Exam   PHYSICAL EXAM     Physical Exam  Vitals and nursing note reviewed.   Constitutional:       General: She is not in acute distress.     Appearance: She is well-developed. She is not ill-appearing, toxic-appearing or diaphoretic.   HENT:      Head: Normocephalic and atraumatic.   Eyes:      General: No scleral icterus.     Conjunctiva/sclera: Conjunctivae normal.   Cardiovascular:      Rate and Rhythm: Normal rate and regular rhythm.      Heart sounds: Murmur heard.   Pulmonary:      Effort: Pulmonary effort is normal. No respiratory distress.      Breath sounds: Normal breath sounds. No wheezing or rhonchi.   Chest:      Chest wall: No tenderness.   Abdominal:      General: There is no distension.      Palpations: Abdomen is soft. There is no  mass.      Tenderness: There is abdominal tenderness (mild, generalized). There is no right CVA tenderness or left CVA tenderness.   Genitourinary:     Rectum: Normal. Guaiac result negative (green/yellow mucous noted.  NO blood.). No mass, tenderness, anal fissure, external hemorrhoid or internal hemorrhoid. Normal anal tone.   Musculoskeletal:         General: No tenderness or deformity. Normal range of motion.      Cervical back: Normal range of motion.      Right lower leg: No edema.      Left lower leg: No edema.   Lymphadenopathy:      Cervical: No cervical adenopathy.   Skin:     General: Skin is warm and dry.      Comments: Telangiectasias noted scattered over face and upper chest.   Neurological:      Mental Status: She is alert. Mental status is at baseline.   Psychiatric:         Behavior: Behavior normal.           PROCEDURES     Procedures     DATA     Results       Procedure Component Value Units Date/Time    Bilirubin, direct [[id number]]  (Abnormal) Collected: 11/23/24 1658    Specimen: Blood, Venous Updated: 11/23/24 1933     Bilirubin, Direct 0.5 mg/dL      Comment: Moderate hemolysis, results may be affected.        Hepatitis panel, acute [[id number]] Collected: 11/23/24 1658    Specimen: Blood, Venous Updated: 11/23/24 1900    Occult blood gastric / duodenum Stomach [[id number]]  (Abnormal) Collected: 11/23/24 1752    Specimen: Body Fluid from Stomach Updated: 11/23/24 1806     pH, Gastric 2.0     Occult Blood, Gastric Positive    CBC and differential [[id number]]  (Abnormal) Collected: 11/23/24 1658    Specimen: Blood, Venous Updated: 11/23/24 1742     WBC 9.32 K/uL      RBC 3.75 M/uL      Hemoglobin 13.5 g/dL      Hematocrit 39.4 %      .1 fL      MCH 36.0 pg      MCHC 34.3 g/dL      RDW 15.3 %      Platelets 113 K/uL      Comment: RESULTS CHECKEDRESULTS OBTAINED AFTER VORTEXING TO ELIMINATE PLT CLUMPSSPECIMEN QUALITY CHECKED        MPV 11.6 fL      Differential Type Auto     nRBC 0.0 %       Immature Granulocytes 0.3 %      Neutrophils 67.8 %      Lymphocytes 19.3 %      Monocytes 10.8 %      Eosinophils 1.0 %      Basophils 0.8 %      Immature Granulocytes, Absolute 0.03 K/uL      Neutrophils, Absolute 6.32 K/uL      Lymphocytes, Absolute 1.80 K/uL      Monocytes, Absolute 1.01 K/uL      Eosinophils, Absolute 0.09 K/uL      Basophils, Absolute 0.07 K/uL      PLT Morphology Normal     Platelet Estimate Decreased (51,000-149,000)     Anisocytosis 1+     Macrocytes 1+     Polychromasia Occasional     Stomatocytes 1+    Comprehensive metabolic panel [[id number]]  (Abnormal) Collected: 11/23/24 1658    Specimen: Blood, Venous Updated: 11/23/24 1734     Sodium 133 mEQ/L      Comment: Moderate hemolysis, results may be affected.         Potassium 4.5 mEQ/L      Comment: Results obtained on plasma. Plasma Potassium values may be up to 0.4 mEQ/L less than serum values. The differences may be greater for patients with high platelet or white cell counts.  Moderate hemolysis, results may be affected.         Chloride 94 mEQ/L      CO2 26 mEQ/L      BUN 12 mg/dL      Creatinine 0.9 mg/dL      Glucose 167 mg/dL      Calcium 9.7 mg/dL      AST (SGOT) 121 IU/L      Comment: Moderate hemolysis, results may be affected.         ALT (SGPT) 46 IU/L      Alkaline Phosphatase 139 IU/L      Comment: Moderate hemolysis, results may be affected.         Total Protein 9.0 g/dL      Comment: Test performed on plasma which typically contains approximately 0.4 g/dL more protein than serum.        Albumin 4.3 g/dL      Comment: Moderate hemolysis, results may be affected.         Bilirubin, Total 3.9 mg/dL      eGFR >60.0 mL/min/1.73m*2      Comment: Calculation based on the Chronic Kidney Disease Epidemiology Collaboration (CKD-EPI) equation refit without adjustment for race.        Anion Gap 13 mEQ/L     BhCG, Qual (Serum) [[id number]]  (Normal) Collected: 11/23/24 1658    Specimen: Blood, Venous Updated: 11/23/24 1731     Preg  Test, Serum Negative    Strawberry Draw Panel [[id number]] Collected: 11/23/24 1658    Specimen: Blood, Venous Updated: 11/23/24 1706    Narrative:      The following orders were created for panel order Strawberry Draw Panel.  Procedure                               Abnormality         Status                     ---------                               -----------         ------                     RAINBOW LT BLUE[[id number]]                                  In process                   Please view results for these tests on the individual orders.    JOANA LT BLUE [[id number]] Collected: 11/23/24 1658    Specimen: Blood, Venous Updated: 11/23/24 1706            Imaging Results              CT ANGIOGRAPHY CHEST PE / CT ABDOMEN PELVIS WITH IV CONTRAST (Final result)  Result time 11/23/24 19:18:39      Final result                   Impression:    IMPRESSION:    1.  No pulmonary embolism.    2.  No acute inflammatory process in the chest, abdomen, or pelvis.    3.  Hepatomegaly and hepatic steatosis.                   Narrative:    CLINICAL HISTORY: Cough, weight loss    COMMENT: CT angiogram examination of the chest was performed using thin  sections.  This was followed by contrast-enhanced CT of abdomen and pelvis.  100  mL of Isovue-370 intravenous contrast were administered. Concurrent supervision  was provided and 3D images were performed on an independent workstation to  better delineate the findings identified in the base study and to assist  treatment planning.  Coronal and sagittal reformats were generated.    CT DOSE:  One or more dose reduction techniques (e.g. automated exposure  control, adjustment of the mA and/or kV according to patient size, use of  iterative reconstruction technique) utilized for this examination.    Comparison is made to prior study from 2021.  Pulmonary arteries: No filling  defects.    HEART: Normal size.  No pericardial effusion.    Mediastinal, hilar, axillary lymphadenopathy:  None    Lungs and pleura: There is no pleural effusion or pneumothorax.  There is no  focal consolidation.    Liver: Enlarged.  Findings of hepatic steatosis.    Gallbladder: Unremarkable    Spleen: Unremarkable    Pancreas: Unremarkable    Adrenal glands: Unremarkable    Kidneys: No hydronephrosis.    Bowel: The nonopacified bowel is without evidence of obstruction.    Ascites: None    Lymphadenopathy: None    Uterus: Unremarkable    Ovaries: There is a cyst in the left ovary measuring 3.1 cm.    Bladder: Unremarkable    Osseus structures: There are degenerative changes in the spine.    Soft tissues: There are multiple areas of fat necrosis in the anterior abdomen  possibly related to injections.                                      No orders to display       Scoring tools                                  ED Course & MDM   MDM / ED COURSE / CLINICAL IMPRESSION / DISPO   Vital Signs Review: Vital signs have been reviewed.   The oxygen saturation is SpO2: 96 % which is normal.    Medical Decision Making        ED Course as of 11/23/24 2218   Sat Nov 23, 2024   1843 Bilirubin, Total(!): 3.9 [ND]   1843 Alkaline Phosphatase(!): 139 [ND]   1859 Upon record review, these labs appear similar to 2021.  At that time, patient had nausea, vomiting, diarrhea, and abdominal pain.  Patient then had gallbladder ultrasound, CT, and MRI with end result showing liver masses, thought to be from hepatic steatosis.  Patient was discharged home with GI follow-up.    Patient states currently she has a different GI doctor..  In the process of being worked up.    Has never had upper gi bleeding.   [ND]   2045 Pt agreeable to admission for upper gi bleed.      [facility] paged. [ND]   2143 [facility] repaged [ND]   2217 Spoke with [facility], will admit [ND]      ED Course User Index  [ND] Anna Lancaster PA C     Clinical Impression      Elevated bilirubin   Elevated LFTs   Gastrointestinal hemorrhage, unspecified gastrointestinal hemorrhage type    Upper GI bleed     _________________       ED Disposition   Admit / Observation                         Pt given copies of []Radiology study reports   [] Laboratory results     Patient seen during a time of significantly increased volumes, increased boarding in ED, decreased capacity and staff which may have contributed to lengthened stay in ED. Portion of management and initial evaluation may have been done while in the waiting room because of this.  Extensive detailed charting also may be limited secondary to high ED volumes and may not reflect all conversations and decisions made between patient and provider.     This document was created using dragon dictation software.  There might be some typographical errors due to this technology.       Anna Lancaster PA C  11/23/24 2824

## 2024-11-23 NOTE — LETTER
November 25, 2024     Patient: Nimo Deras  YOB: 1980  Date of Visit: 11/23/2024    To Whom it May Concern:    Nimo Deras was admitted to [facility] from  11/23/2024-11/25/24. Please excuse Nimo for her absence from work during this time.     If you have any questions or concerns, please don't hesitate to call.         Sincerely,         Bruna KUMAR  Phone [phone]  Pager #[phone]

## 2024-11-23 NOTE — ED ATTESTATION NOTE
"Procedures  Physical Exam  Review of Systems    11/23/20246:39 PM  I have personally seen and examined the patient.  I reviewed and agree with the PA/NP/Resident's assessment and plan of care.    My examination, assessment, and plan of care of Nimo Deras is as follows:  The patient presents with abdominal pain, cough, bloody diarrhea and now vomiting blood.  This is a 44-year-old female with history of IBS who has been seeing a GI specialist for her GI issues and other specialist as well for her cough.  The patient has been sick with a cough for the past 2 months.  She is scheduled to see multiple specialists for this.  In addition, she has had rectal bleeding which is bright red over the past few weeks.  She is vomiting blood today.  She also has had a 20 pound weight loss over the past 2 weeks.  The patient states that she has not been vomiting prior to today but she has no appetite and states she cannot eat.  She apparently has had similar symptoms in the past and was found to have \"liver masses.\"  The patient states her vomiting initially was brown and then it turned bright red.  She is not on a blood thinner.  Exam: The patient is alert in no acute distress.  Her abdomen is soft without significant tenderness.  There is no voluntary guarding or rebound tenderness.  Impression/Plan: The patient is being evaluated with lab work and then she will go for CAT scans of the chest, abdomen and pelvis.  Lab work reveals an AST of 121 with a normal ALT.  The alk phos is elevated at 139.  Bilirubin is 3.9.  Ultimately we will plan on admitting this patient to the medical service for further evaluation and treatment.    Vital Sign Review: Vital signs have been ordered and reviewed. The oxygen saturation is 96% on room air, normal    MDM     I was physically present for the key/critical portions of the following procedures: None    This document was created using dragon dictation software.  There might be some " typographical errors due to this technology.     Harley Mo,   11/23/24 5631

## 2024-11-24 LAB
ABO + RH BLD: NORMAL
ANION GAP SERPL CALC-SCNC: 12 MEQ/L (ref 3–15)
ANION GAP SERPL CALC-SCNC: 9 MEQ/L (ref 3–15)
BLD GP AB SCN SERPL QL: NEGATIVE
BUN SERPL-MCNC: 12 MG/DL (ref 7–25)
BUN SERPL-MCNC: 12 MG/DL (ref 7–25)
CALCIUM SERPL-MCNC: 8.7 MG/DL (ref 8.6–10.3)
CALCIUM SERPL-MCNC: 8.9 MG/DL (ref 8.6–10.3)
CHLORIDE SERPL-SCNC: 96 MEQ/L (ref 98–107)
CHLORIDE SERPL-SCNC: 99 MEQ/L (ref 98–107)
CO2 SERPL-SCNC: 26 MEQ/L (ref 21–31)
CO2 SERPL-SCNC: 27 MEQ/L (ref 21–31)
CREAT SERPL-MCNC: 0.8 MG/DL (ref 0.6–1.2)
CREAT SERPL-MCNC: 0.8 MG/DL (ref 0.6–1.2)
D AG BLD QL: POSITIVE
EGFRCR SERPLBLD CKD-EPI 2021: >60 ML/MIN/1.73M*2
EGFRCR SERPLBLD CKD-EPI 2021: >60 ML/MIN/1.73M*2
ERYTHROCYTE [DISTWIDTH] IN BLOOD BY AUTOMATED COUNT: 15.1 % (ref 11.7–14.4)
ERYTHROCYTE [DISTWIDTH] IN BLOOD BY AUTOMATED COUNT: 15.1 % (ref 11.7–14.4)
FERRITIN SERPL-MCNC: 1416 NG/ML (ref 11–250)
GLUCOSE SERPL-MCNC: 105 MG/DL (ref 70–99)
GLUCOSE SERPL-MCNC: 97 MG/DL (ref 70–99)
HCT VFR BLD AUTO: 33.9 % (ref 35–45)
HCT VFR BLD AUTO: 34.8 % (ref 35–45)
HGB BLD-MCNC: 11.4 G/DL (ref 11.8–15.7)
HGB BLD-MCNC: 12 G/DL (ref 11.8–15.7)
IRON SATN MFR SERPL: 69 % (ref 15–45)
IRON SERPL-MCNC: 128 UG/DL (ref 35–150)
LABORATORY COMMENT REPORT: NORMAL
LDH SERPL L TO P-CCNC: 156 IU/L (ref 98–271)
MAGNESIUM SERPL-MCNC: 1.9 MG/DL (ref 1.8–2.5)
MCH RBC QN AUTO: 35.7 PG (ref 28–33.2)
MCH RBC QN AUTO: 36 PG (ref 28–33.2)
MCHC RBC AUTO-ENTMCNC: 33.6 G/DL (ref 32.2–35.5)
MCHC RBC AUTO-ENTMCNC: 34.5 G/DL (ref 32.2–35.5)
MCV RBC AUTO: 104.5 FL (ref 83–98)
MCV RBC AUTO: 106.3 FL (ref 83–98)
PLATELET # BLD AUTO: 70 K/UL (ref 150–369)
PLATELET # BLD AUTO: 81 K/UL (ref 150–369)
PMV BLD AUTO: 11.7 FL (ref 9.4–12.3)
PMV BLD AUTO: 12.1 FL (ref 9.4–12.3)
POTASSIUM SERPL-SCNC: 2.8 MEQ/L (ref 3.5–5.1)
POTASSIUM SERPL-SCNC: 3.1 MEQ/L (ref 3.5–5.1)
RBC # BLD AUTO: 3.19 M/UL (ref 3.93–5.22)
RBC # BLD AUTO: 3.33 M/UL (ref 3.93–5.22)
SODIUM SERPL-SCNC: 134 MEQ/L (ref 136–145)
SODIUM SERPL-SCNC: 135 MEQ/L (ref 136–145)
SPECIMEN EXP DATE BLD: NORMAL
TIBC SERPL-MCNC: 185 UG/DL (ref 270–460)
UIBC SERPL-MCNC: 57 UG/DL (ref 180–360)
VIT B12 SERPL-MCNC: 696 PG/ML (ref 180–914)
WBC # BLD AUTO: 5.85 K/UL (ref 3.8–10.5)
WBC # BLD AUTO: 7.48 K/UL (ref 3.8–10.5)

## 2024-11-24 PROCEDURE — 36415 COLL VENOUS BLD VENIPUNCTURE: CPT | Performed by: STUDENT IN AN ORGANIZED HEALTH CARE EDUCATION/TRAINING PROGRAM

## 2024-11-24 PROCEDURE — 85027 COMPLETE CBC AUTOMATED: CPT | Performed by: STUDENT IN AN ORGANIZED HEALTH CARE EDUCATION/TRAINING PROGRAM

## 2024-11-24 PROCEDURE — 200200 PR NO CHARGE: Performed by: STUDENT IN AN ORGANIZED HEALTH CARE EDUCATION/TRAINING PROGRAM

## 2024-11-24 PROCEDURE — 83615 LACTATE (LD) (LDH) ENZYME: CPT | Performed by: INTERNAL MEDICINE

## 2024-11-24 PROCEDURE — 63700000 HC SELF-ADMINISTRABLE DRUG

## 2024-11-24 PROCEDURE — 82728 ASSAY OF FERRITIN: CPT | Performed by: STUDENT IN AN ORGANIZED HEALTH CARE EDUCATION/TRAINING PROGRAM

## 2024-11-24 PROCEDURE — 82607 VITAMIN B-12: CPT | Performed by: STUDENT IN AN ORGANIZED HEALTH CARE EDUCATION/TRAINING PROGRAM

## 2024-11-24 PROCEDURE — 63600000 HC DRUGS/DETAIL CODE: Mod: JZ | Performed by: STUDENT IN AN ORGANIZED HEALTH CARE EDUCATION/TRAINING PROGRAM

## 2024-11-24 PROCEDURE — 83735 ASSAY OF MAGNESIUM: CPT | Performed by: STUDENT IN AN ORGANIZED HEALTH CARE EDUCATION/TRAINING PROGRAM

## 2024-11-24 PROCEDURE — 99233 SBSQ HOSP IP/OBS HIGH 50: CPT | Performed by: STUDENT IN AN ORGANIZED HEALTH CARE EDUCATION/TRAINING PROGRAM

## 2024-11-24 PROCEDURE — 83010 ASSAY OF HAPTOGLOBIN QUANT: CPT | Performed by: INTERNAL MEDICINE

## 2024-11-24 PROCEDURE — 83540 ASSAY OF IRON: CPT | Performed by: STUDENT IN AN ORGANIZED HEALTH CARE EDUCATION/TRAINING PROGRAM

## 2024-11-24 PROCEDURE — 80048 BASIC METABOLIC PNL TOTAL CA: CPT | Performed by: STUDENT IN AN ORGANIZED HEALTH CARE EDUCATION/TRAINING PROGRAM

## 2024-11-24 PROCEDURE — 63700000 HC SELF-ADMINISTRABLE DRUG: Performed by: STUDENT IN AN ORGANIZED HEALTH CARE EDUCATION/TRAINING PROGRAM

## 2024-11-24 PROCEDURE — 86901 BLOOD TYPING SEROLOGIC RH(D): CPT

## 2024-11-24 PROCEDURE — 21400000 HC ROOM AND CARE CCU/INTERMEDIATE

## 2024-11-24 RX ORDER — DEXTROSE 50 % IN WATER (D50W) INTRAVENOUS SYRINGE
25 AS NEEDED
Status: DISCONTINUED | OUTPATIENT
Start: 2024-11-24 | End: 2024-11-25 | Stop reason: HOSPADM

## 2024-11-24 RX ORDER — ROSUVASTATIN CALCIUM 20 MG/1
20 TABLET, COATED ORAL DAILY
Status: DISCONTINUED | OUTPATIENT
Start: 2024-11-24 | End: 2024-11-25 | Stop reason: HOSPADM

## 2024-11-24 RX ORDER — CETIRIZINE HYDROCHLORIDE 10 MG/1
10 TABLET ORAL DAILY
Status: DISCONTINUED | OUTPATIENT
Start: 2024-11-24 | End: 2024-11-25 | Stop reason: HOSPADM

## 2024-11-24 RX ORDER — IBUPROFEN 200 MG
16-32 TABLET ORAL AS NEEDED
Status: DISCONTINUED | OUTPATIENT
Start: 2024-11-24 | End: 2024-11-25 | Stop reason: HOSPADM

## 2024-11-24 RX ORDER — POTASSIUM CHLORIDE 20 MEQ/1
20 TABLET, EXTENDED RELEASE ORAL AS NEEDED
Status: DISCONTINUED | OUTPATIENT
Start: 2024-11-24 | End: 2024-11-25 | Stop reason: HOSPADM

## 2024-11-24 RX ORDER — POTASSIUM CHLORIDE 14.9 MG/ML
20 INJECTION INTRAVENOUS
Status: COMPLETED | OUTPATIENT
Start: 2024-11-24 | End: 2024-11-24

## 2024-11-24 RX ORDER — ACETAMINOPHEN 500 MG
5 TABLET ORAL ONCE
Status: COMPLETED | OUTPATIENT
Start: 2024-11-24 | End: 2024-11-24

## 2024-11-24 RX ORDER — METOPROLOL SUCCINATE 50 MG/1
50 TABLET, EXTENDED RELEASE ORAL
Status: DISCONTINUED | OUTPATIENT
Start: 2024-11-24 | End: 2024-11-25 | Stop reason: HOSPADM

## 2024-11-24 RX ORDER — POTASSIUM CHLORIDE 20 MEQ/1
40 TABLET, EXTENDED RELEASE ORAL AS NEEDED
Status: DISCONTINUED | OUTPATIENT
Start: 2024-11-24 | End: 2024-11-25 | Stop reason: HOSPADM

## 2024-11-24 RX ORDER — DEXTROSE 40 %
15-30 GEL (GRAM) ORAL AS NEEDED
Status: DISCONTINUED | OUTPATIENT
Start: 2024-11-24 | End: 2024-11-25 | Stop reason: HOSPADM

## 2024-11-24 RX ORDER — PANTOPRAZOLE SODIUM 40 MG/10ML
40 INJECTION, POWDER, LYOPHILIZED, FOR SOLUTION INTRAVENOUS
Status: DISCONTINUED | OUTPATIENT
Start: 2024-11-24 | End: 2024-11-25

## 2024-11-24 RX ADMIN — POTASSIUM CHLORIDE 20 MEQ: 14.9 INJECTION INTRAVENOUS at 05:22

## 2024-11-24 RX ADMIN — PANTOPRAZOLE SODIUM 40 MG: 40 INJECTION, POWDER, FOR SOLUTION INTRAVENOUS at 17:21

## 2024-11-24 RX ADMIN — Medication 5 MG: at 22:49

## 2024-11-24 RX ADMIN — PANTOPRAZOLE SODIUM 40 MG: 40 INJECTION, POWDER, FOR SOLUTION INTRAVENOUS at 06:47

## 2024-11-24 RX ADMIN — POTASSIUM CHLORIDE 40 MEQ: 1500 TABLET, EXTENDED RELEASE ORAL at 22:49

## 2024-11-24 RX ADMIN — POTASSIUM CHLORIDE 20 MEQ: 14.9 INJECTION INTRAVENOUS at 06:55

## 2024-11-24 ASSESSMENT — ENCOUNTER SYMPTOMS
COUGH: 0
MUSCULOSKELETAL NEGATIVE: 1
ABDOMINAL PAIN: 0
PALPITATIONS: 0
WHEEZING: 0
FATIGUE: 1
HEADACHES: 0
SHORTNESS OF BREATH: 0
WEAKNESS: 0
DIZZINESS: 0
DIARRHEA: 0
CHILLS: 0
NAUSEA: 1
CONSTIPATION: 0
VOMITING: 0
FEVER: 0
BRUISES/BLEEDS EASILY: 0
BLOOD IN STOOL: 1

## 2024-11-24 ASSESSMENT — COGNITIVE AND FUNCTIONAL STATUS - GENERAL
MOVING TO AND FROM BED TO CHAIR: 4 - NONE
WALKING IN HOSPITAL ROOM: 4 - NONE
STANDING UP FROM CHAIR USING ARMS: 4 - NONE
WALKING IN HOSPITAL ROOM: 4 - NONE
CLIMB 3 TO 5 STEPS WITH RAILING: 4 - NONE
STANDING UP FROM CHAIR USING ARMS: 4 - NONE
WALKING IN HOSPITAL ROOM: 4 - NONE
MOVING TO AND FROM BED TO CHAIR: 4 - NONE
CLIMB 3 TO 5 STEPS WITH RAILING: 4 - NONE
MOVING TO AND FROM BED TO CHAIR: 4 - NONE
STANDING UP FROM CHAIR USING ARMS: 4 - NONE
CLIMB 3 TO 5 STEPS WITH RAILING: 4 - NONE

## 2024-11-24 NOTE — CONSULTS
GI Consult Note          Subjective   Nimo Deras is a 44 y.o. female being seen in consultation for hematemesis.  Patient has history of rheumatoid arthritis.  Yesterday she was feeling nauseous and had a few episodes of vomiting up what she drank.  She then noticed blood in her vomit.  Over the past 2 weeks she has had food intolerance and some weight loss.  In the ER she vomited up 400 cc of fluid that reportedly contained some blood.  Her hemoglobin is normal.  Routine consult was placed for GI.  She has not had any further vomiting since being transferred to the floor last evening.  She saw her bright red blood at home in her stool.  She has never had an upper endoscopy or colonoscopy.  She denies any significant NSAID use.    Past Medical History:   Diagnosis Date    Hypertension     Lipid disorder      No past surgical history on file.  Social History     Social History Narrative    Not on file     No family history on file.  No Known Allergies    Home Medications:   [Provider Managed Hold] cetirizine (ZyrTEC) tablet 10 mg  10 mg oral Daily    glucose chewable tablet 16-32 g of dextrose  16-32 g of dextrose oral PRN    Or    dextrose 40 % oral gel 15-30 g of dextrose  15-30 g of dextrose oral PRN    Or    glucagon (GLUCAGEN) injection 1 mg  1 mg intramuscular PRN    Or    dextrose 50 % in water (D50) injection 12.5 g  25 mL intravenous PRN    magnesium sulfate IVPB 1g in 100 mL NSS/D5W/SWFI  1 g intravenous PRN    Or    magnesium sulfate IVPB 2g in 50 mL NSS/D5W/SWFI  2 g intravenous PRN    [Provider Managed Hold] metoprolol succinate XL (TOPROL-XL) 24 hr ER tablet 50 mg  50 mg oral Daily (8a)    pantoprazole (PROTONIX) injection 40 mg  40 mg intravenous q12h INT    potassium chloride (KLOR-CON M) ER tablet (particles/crystals) 20 mEq  20 mEq oral PRN    potassium chloride (KLOR-CON M) ER tablet (particles/crystals) 40 mEq  40 mEq oral PRN    [Provider Managed Hold] rosuvastatin (CRESTOR) tablet 20  "mg  20 mg oral Daily     Current Medications:  Current Facility-Administered Medications   Medication Dose Route Frequency Provider Last Rate Last Admin    [Provider Managed Hold] cetirizine (ZyrTEC) tablet 10 mg  10 mg oral Daily John Issa DO        glucose chewable tablet 16-32 g of dextrose  16-32 g of dextrose oral PRN John Issa DO        Or    dextrose 40 % oral gel 15-30 g of dextrose  15-30 g of dextrose oral PRN John Issa DO        Or    glucagon (GLUCAGEN) injection 1 mg  1 mg intramuscular PRN John Issa DO        Or    dextrose 50 % in water (D50) injection 12.5 g  25 mL intravenous PRN John Issa DO        magnesium sulfate IVPB 1g in 100 mL NSS/D5W/SWFI  1 g intravenous PRN Nilda Echols DO        Or    magnesium sulfate IVPB 2g in 50 mL NSS/D5W/SWFI  2 g intravenous PRN Nilda Echols DO        [Provider Managed Hold] metoprolol succinate XL (TOPROL-XL) 24 hr ER tablet 50 mg  50 mg oral Daily (8a) John Issa DO        pantoprazole (PROTONIX) injection 40 mg  40 mg intravenous q12h INT John Issa DO   40 mg at 11/24/24 0647    potassium chloride (KLOR-CON M) ER tablet (particles/crystals) 20 mEq  20 mEq oral PRN Nilda Echols DO        potassium chloride (KLOR-CON M) ER tablet (particles/crystals) 40 mEq  40 mEq oral PRN Nilda Echols DO        [Provider Managed Hold] rosuvastatin (CRESTOR) tablet 20 mg  20 mg oral Daily John Issa DO             Review of Systems  All other systems (10 point) were reviewed and are negative other than as stated in the HPI    Physical Exam  Visit Vitals  BP (!) 108/51 (BP Location: Right upper arm, Patient Position: Lying)   Pulse 74   Temp 36.7 °C (98.1 °F) (Oral)   Resp 18   Ht 1.778 m (5' 10\")   Wt 88 kg (194 lb)   SpO2 92%   BMI 27.84 kg/m²       General appearance: alert, well developed and well nourished  Eyes: sclera anicteric  Head: normocephalic, without obvious abnormality, atraumatic  Lungs: clear to " auscultation bilaterally  Heart: regular rate and rhythm, S1, S2 normal, no murmur, rub or gallop  Abdomen: soft, non-tender; bowel sounds normal; no masses, no organomegaly  Rectal: not examined  Extremities: extremities normal, warm and well-perfused; no cyanosis, clubbing, or edema  Skin: Skin color, texture, turgor normal. No rashes or lesions  Neurologic: Grossly normal     Labs  Pertinent labs reviewed and discussed in HPI.    Imaging  Pertinent imaging reviewed and discussed in HPI.   Assessment                Analilia Smalls,   11/24/2024   Elevated bilirubin  Assessment & Plan  - Elevated bilirubin that is indirect along with thrombocytopenia, hypokalemia, elevated ferritin  -CAT scan with hepatic steatosis and hepatomegaly    Plan  -Will check LDH and haptoglobin.  Consider peripheral smear and hematology consult  -Will check right upper quadrant ultrasound for completeness    * Upper GI bleed  Assessment & Plan  - Patient admitted with bright red blood per rectum for a few weeks, brown vomiting that turned bloody.  No hematemesis since presentation and hemoglobin normal  -Also with numerous other lab abnormalities including hypokalemia, indirect hyperbilirubinemia, thrombocytopenia and markedly elevated ferritin    Plan  -As she is not currently actively bleeding recommend optimization of her electrolytes and will plan for upper endoscopy tomorrow.  Suspect probable Marilou-Moreno tear, however there may be another process with her recent weight loss and food intolerance.  -Recommend PPI IV twice daily  -Would keep n.p.o. today in case the urgency of her procedure changes

## 2024-11-24 NOTE — H&P
Hospital Medicine Service -  History & Physical        CHIEF COMPLAINT   Hematemesis     HISTORY OF PRESENT ILLNESS      Nimo Deras is a 44 y.o. female with a past medical history of hypertension, hypercholesterolemia, and rheumatoid arthritis who presents to the hospital for the evaluation of hematemesis. The patient began experiencing bright red blood with vomiting on 11/23/2024. This was preceded by 2 weeks of weight loss, 2 months of coughing, food intolerance, and reported bright red blood per rectum.  The patient denies any recent food or medication changes.  This is associated with fatigue.  The patient denies any shortness of breath, wheezing, chest pain, or palpitations.    Upon presentation to the emergency department, the patient was mildly hypertensive at 156/89.  Laboratory studies were notable for mild hyponatremia, AST of 121, alkaline phosphatase of 139, total bilirubin of 3.9, MCV of 105.1, and positive gastric occult blood. CT scans were performed and did not show any pulmonary emboli.  No inflammatory process was noted in the chest, abdomen, or pelvis, however, hepatomegaly and hepatic steatosis were noted.  Telemetry showed sinus rhythm at approximately 70 bpm without signs of acute ischemia.    At this time, the patient will be admitted to the hospital for further assessment and treatment of her upper GI bleeding.  Gastroenterology has been consulted for recommendations.  The patient has also been made n.p.o. pending possible endoscopy.    PAST MEDICAL AND SURGICAL HISTORY      Past Medical History:   Diagnosis Date    Hypertension     Lipid disorder        No past surgical history on file.    PCP: Tabitha Sánchez MD    MEDICATIONS      Prior to Admission medications    Medication Sig Start Date End Date Taking? Authorizing Provider   cetirizine (ZyrTEC) 10 mg tablet Take 10 mg by mouth daily.    Provider, MD Rena   levonorgestreL (MIRENA) 18.6 mcg/24 hours (5 yrs) 52 mg  intrauterine device No SIG Entered 8/13/14   Rena Florian MD   metoprolol succinate XL (TOPROL-XL) 50 mg 24 hr tablet Take 50 mg by mouth once daily. 12/20/20   Rena Florian MD   simvastatin (ZOCOR) 20 mg tablet Take 20 mg by mouth once daily. 12/20/20   Rena Florian MD       ALLERGIES      Patient has no known allergies.    FAMILY HISTORY      No family history on file.    SOCIAL HISTORY      Social History     Socioeconomic History    Marital status: Single   Tobacco Use    Smoking status: Former    Smokeless tobacco: Never   Substance and Sexual Activity    Alcohol use: Not Currently    Drug use: Not Currently     Social Drivers of Health     Food Insecurity: No Food Insecurity (11/23/2024)    Hunger Vital Sign     Worried About Running Out of Food in the Last Year: Never true     Ran Out of Food in the Last Year: Never true       REVIEW OF SYSTEMS      Review of Systems   Constitutional:  Positive for fatigue. Negative for chills and fever.   HENT: Negative.     Respiratory:  Negative for cough, shortness of breath and wheezing.    Cardiovascular:  Negative for chest pain and palpitations.   Gastrointestinal:  Positive for blood in stool and nausea. Negative for abdominal pain, constipation, diarrhea and vomiting.   Genitourinary: Negative.    Musculoskeletal: Negative.    Skin: Negative.    Neurological:  Negative for dizziness, weakness and headaches.   Hematological:  Does not bruise/bleed easily.         PHYSICAL EXAMINATION      Temp:  [36.3 °C (97.3 °F)-36.7 °C (98.1 °F)] 36.7 °C (98.1 °F)  Heart Rate:  [66-78] 66  Resp:  [16-18] 16  BP: (121-156)/(58-89) 121/58  Body mass index is 27.84 kg/m².    Physical Exam  Constitutional:       Appearance: She is ill-appearing.   HENT:      Head: Normocephalic and atraumatic.      Nose: No congestion or rhinorrhea.      Mouth/Throat:      Mouth: Mucous membranes are dry.      Pharynx: Oropharynx is clear.   Eyes:      General: No scleral  icterus.     Conjunctiva/sclera: Conjunctivae normal.   Cardiovascular:      Rate and Rhythm: Normal rate and regular rhythm.      Heart sounds: No murmur heard.     No friction rub. No gallop.   Pulmonary:      Breath sounds: No wheezing, rhonchi or rales.   Abdominal:      General: Abdomen is flat.      Palpations: Abdomen is soft.   Musculoskeletal:      Right lower leg: No edema.      Left lower leg: No edema.   Skin:     General: Skin is warm and dry.   Neurological:      General: No focal deficit present.      Mental Status: She is alert and oriented to person, place, and time.         LABS / IMAGING / EKG        Labs  Lab Results   Component Value Date    WBC 9.32 11/23/2024    HGB 13.5 11/23/2024    HCT 39.4 11/23/2024    .1 (H) 11/23/2024     (L) 11/23/2024     Lab Results   Component Value Date    GLUCOSE 167 (H) 11/23/2024    CALCIUM 9.7 11/23/2024     (L) 11/23/2024    K 4.5 11/23/2024    CO2 26 11/23/2024    CL 94 (L) 11/23/2024    BUN 12 11/23/2024    CREATININE 0.9 11/23/2024       Imaging  See HPI      ECG/Telemetry  See HPI               Assessment & Plan  Upper GI bleed  Acute condition  The patient began experiencing bright red blood with vomiting on 11/23/2024.  This was preceded by 2 weeks of weight loss, 2 months of coughing, food intolerance, and reported bright red blood per rectum.  Gastric contents positive for blood.  Pantoprazole 40 mg IV every 12 hours.  NPO after midnight.  BMP daily.  CBC every 12 hours.  Elevated bilirubin  Acute condition.  CT scan shows hepatic steatosis.  In 2021, the patient had similar laboratory abnormalities in the setting of acute illness.  Continue to monitor.  Gastroenterology consulted.  Hypertension  Chronic condition.  Hold home regimen of metoprolol while NPO.  Hypercholesterolemia  Chronic condition.  Hold home regimen of rosuvastatin while NPO.  Rheumatoid arthritis (CMS/HCC)  Chronic condition.  Currently not on medical  therapy.  Symptom control as needed.  Macrocytosis  MCV elevated at 105.1.  Iron panel and vitamin B12 levels pending.  CBC daily.    VTE Assessment: Padua VTE Score: 0  VTE Prophylaxis: Current anticoagulants:    None      Code Status: Full Code      Discussed advanced care planning- At this time, the patient is full code.  Estimated Discharge Date: 2024  Disposition Plannin to 72 hours.     Dr. John Issa, DO  [facility]  2024

## 2024-11-24 NOTE — ASSESSMENT & PLAN NOTE
- Patient admitted with bright red blood per rectum for a few weeks, brown vomiting that turned bloody.  No hematemesis since presentation and hemoglobin normal  -Also with numerous other lab abnormalities including hypokalemia, indirect hyperbilirubinemia, thrombocytopenia and markedly elevated ferritin    Plan  -As she is not currently actively bleeding recommend optimization of her electrolytes and will plan for upper endoscopy tomorrow.  Suspect probable Marilou-Moreno tear, however there may be another process with her recent weight loss and food intolerance.  -Recommend PPI IV twice daily  -Would keep n.p.o. today in case the urgency of her procedure changes

## 2024-11-24 NOTE — PLAN OF CARE
Plan of Care Review  Progress: improving  Outcome Evaluation: pt AAOX4, feeling overall better. Awaiting to meet with GI in the AM. Pt is NPO since 0000.HGB is stable. pt independent in the room. can make needs known. Continuing to monitor. TAMMIE RN

## 2024-11-24 NOTE — ASSESSMENT & PLAN NOTE
- Elevated bilirubin that is indirect along with thrombocytopenia, hypokalemia, elevated ferritin  -CAT scan with hepatic steatosis and hepatomegaly    Plan  -Will check LDH and haptoglobin.  Consider peripheral smear and hematology consult  -Will check right upper quadrant ultrasound for completeness

## 2024-11-24 NOTE — ASSESSMENT & PLAN NOTE
Acute condition.  CT scan shows hepatic steatosis.  In 2021, the patient had similar laboratory abnormalities in the setting of acute illness.  Continue to monitor.  Gastroenterology consulted.

## 2024-11-24 NOTE — ASSESSMENT & PLAN NOTE
Acute condition  The patient began experiencing bright red blood with vomiting on 11/23/2024.  This was preceded by 2 weeks of weight loss, 2 months of coughing, food intolerance, and reported bright red blood per rectum.  Gastric contents positive for blood.  Pantoprazole 40 mg IV every 12 hours.  NPO after midnight.  BMP daily.  CBC every 12 hours.

## 2024-11-25 ENCOUNTER — ANESTHESIA EVENT (INPATIENT)
Dept: ENDOSCOPY | Facility: HOSPITAL | Age: 44
DRG: 378 | End: 2024-11-25
Payer: COMMERCIAL

## 2024-11-25 ENCOUNTER — ANESTHESIA (INPATIENT)
Dept: ENDOSCOPY | Facility: HOSPITAL | Age: 44
DRG: 378 | End: 2024-11-25
Payer: COMMERCIAL

## 2024-11-25 VITALS
BODY MASS INDEX: 27.77 KG/M2 | OXYGEN SATURATION: 94 % | RESPIRATION RATE: 16 BRPM | SYSTOLIC BLOOD PRESSURE: 128 MMHG | DIASTOLIC BLOOD PRESSURE: 58 MMHG | TEMPERATURE: 97.3 F | WEIGHT: 194 LBS | HEIGHT: 70 IN | HEART RATE: 72 BPM

## 2024-11-25 PROBLEM — K92.2 GASTROINTESTINAL HEMORRHAGE: Status: ACTIVE | Noted: 2024-11-23

## 2024-11-25 LAB
ANION GAP SERPL CALC-SCNC: 9 MEQ/L (ref 3–15)
BUN SERPL-MCNC: 11 MG/DL (ref 7–25)
CALCIUM SERPL-MCNC: 9.1 MG/DL (ref 8.6–10.3)
CHLORIDE SERPL-SCNC: 99 MEQ/L (ref 98–107)
CO2 SERPL-SCNC: 26 MEQ/L (ref 21–31)
CREAT SERPL-MCNC: 0.9 MG/DL (ref 0.6–1.2)
EGFRCR SERPLBLD CKD-EPI 2021: >60 ML/MIN/1.73M*2
ERYTHROCYTE [DISTWIDTH] IN BLOOD BY AUTOMATED COUNT: 15 % (ref 11.7–14.4)
GLUCOSE SERPL-MCNC: 93 MG/DL (ref 70–99)
HAPTOGLOB SERPL-MCNC: 74 MG/DL (ref 41–203)
HCT VFR BLD AUTO: 35.1 % (ref 35–45)
HGB BLD-MCNC: 12 G/DL (ref 11.8–15.7)
MCH RBC QN AUTO: 36.3 PG (ref 28–33.2)
MCHC RBC AUTO-ENTMCNC: 34.2 G/DL (ref 32.2–35.5)
MCV RBC AUTO: 106 FL (ref 83–98)
PLATELET # BLD AUTO: 63 K/UL (ref 150–369)
PMV BLD AUTO: 12.4 FL (ref 9.4–12.3)
POTASSIUM SERPL-SCNC: 3.5 MEQ/L (ref 3.5–5.1)
RBC # BLD AUTO: 3.31 M/UL (ref 3.93–5.22)
SODIUM SERPL-SCNC: 134 MEQ/L (ref 136–145)
WBC # BLD AUTO: 6.15 K/UL (ref 3.8–10.5)

## 2024-11-25 PROCEDURE — 36415 COLL VENOUS BLD VENIPUNCTURE: CPT | Performed by: STUDENT IN AN ORGANIZED HEALTH CARE EDUCATION/TRAINING PROGRAM

## 2024-11-25 PROCEDURE — 63600000 HC DRUGS/DETAIL CODE: Mod: JZ | Performed by: SPECIALIST

## 2024-11-25 PROCEDURE — 0DB68ZX EXCISION OF STOMACH, VIA NATURAL OR ARTIFICIAL OPENING ENDOSCOPIC, DIAGNOSTIC: ICD-10-PCS | Performed by: INTERNAL MEDICINE

## 2024-11-25 PROCEDURE — 25800000 HC PHARMACY IV SOLUTIONS: Performed by: SPECIALIST

## 2024-11-25 PROCEDURE — 88342 IMHCHEM/IMCYTCHM 1ST ANTB: CPT | Performed by: INTERNAL MEDICINE

## 2024-11-25 PROCEDURE — 99239 HOSP IP/OBS DSCHRG MGMT >30: CPT | Mod: FS | Performed by: INTERNAL MEDICINE

## 2024-11-25 PROCEDURE — 85027 COMPLETE CBC AUTOMATED: CPT | Performed by: STUDENT IN AN ORGANIZED HEALTH CARE EDUCATION/TRAINING PROGRAM

## 2024-11-25 PROCEDURE — 71000011 HC PACU PHASE 1 EA ADDL MIN: Performed by: INTERNAL MEDICINE

## 2024-11-25 PROCEDURE — 75000060 HC EGD BIOPSY: Performed by: INTERNAL MEDICINE

## 2024-11-25 PROCEDURE — 63600000 HC DRUGS/DETAIL CODE: Mod: JZ | Performed by: STUDENT IN AN ORGANIZED HEALTH CARE EDUCATION/TRAINING PROGRAM

## 2024-11-25 PROCEDURE — 37000002 HC ANESTHESIA MAC: Performed by: INTERNAL MEDICINE

## 2024-11-25 PROCEDURE — 25000000 HC PHARMACY GENERAL: Performed by: SPECIALIST

## 2024-11-25 PROCEDURE — 71000001 HC PACU PHASE 1 INITIAL 30MIN: Performed by: INTERNAL MEDICINE

## 2024-11-25 PROCEDURE — 82565 ASSAY OF CREATININE: CPT | Performed by: STUDENT IN AN ORGANIZED HEALTH CARE EDUCATION/TRAINING PROGRAM

## 2024-11-25 RX ORDER — PANTOPRAZOLE SODIUM 40 MG/1
40 TABLET, DELAYED RELEASE ORAL
Qty: 90 TABLET | Refills: 0 | Status: SHIPPED | OUTPATIENT
Start: 2024-11-26 | End: 2025-02-24

## 2024-11-25 RX ORDER — SODIUM CHLORIDE 9 MG/ML
INJECTION, SOLUTION INTRAVENOUS CONTINUOUS PRN
Status: DISCONTINUED | OUTPATIENT
Start: 2024-11-25 | End: 2024-11-25 | Stop reason: SURG

## 2024-11-25 RX ORDER — PANTOPRAZOLE SODIUM 40 MG/1
40 TABLET, DELAYED RELEASE ORAL
Status: DISCONTINUED | OUTPATIENT
Start: 2024-11-26 | End: 2024-11-25 | Stop reason: HOSPADM

## 2024-11-25 RX ORDER — GLYCOPYRROLATE 0.6MG/3ML
SYRINGE (ML) INTRAVENOUS AS NEEDED
Status: DISCONTINUED | OUTPATIENT
Start: 2024-11-25 | End: 2024-11-25 | Stop reason: SURG

## 2024-11-25 RX ORDER — LIDOCAINE HYDROCHLORIDE 10 MG/ML
INJECTION, SOLUTION EPIDURAL; INFILTRATION; INTRACAUDAL; PERINEURAL AS NEEDED
Status: DISCONTINUED | OUTPATIENT
Start: 2024-11-25 | End: 2024-11-25 | Stop reason: SURG

## 2024-11-25 RX ORDER — PROPOFOL 200MG/20ML
SYRINGE (ML) INTRAVENOUS AS NEEDED
Status: DISCONTINUED | OUTPATIENT
Start: 2024-11-25 | End: 2024-11-25 | Stop reason: SURG

## 2024-11-25 RX ORDER — PANTOPRAZOLE SODIUM 40 MG/1
40 TABLET, DELAYED RELEASE ORAL
Qty: 90 TABLET | Refills: 0 | Status: SHIPPED | OUTPATIENT
Start: 2024-11-26 | End: 2024-11-25

## 2024-11-25 RX ADMIN — PROPOFOL 100 MG: 10 INJECTION, EMULSION INTRAVENOUS at 09:54

## 2024-11-25 RX ADMIN — SODIUM CHLORIDE: 9 INJECTION, SOLUTION INTRAVENOUS at 09:30

## 2024-11-25 RX ADMIN — PANTOPRAZOLE SODIUM 40 MG: 40 INJECTION, POWDER, FOR SOLUTION INTRAVENOUS at 06:50

## 2024-11-25 RX ADMIN — GLYCOPYRROLATE 0.2 MG: 0.2 INJECTION, SOLUTION INTRAMUSCULAR; INTRAVITREAL at 09:40

## 2024-11-25 RX ADMIN — LIDOCAINE HYDROCHLORIDE 10 ML: 10 INJECTION, SOLUTION EPIDURAL; INFILTRATION; INTRACAUDAL; PERINEURAL at 09:55

## 2024-11-25 RX ADMIN — PROPOFOL 100 MG: 10 INJECTION, EMULSION INTRAVENOUS at 09:57

## 2024-11-25 ASSESSMENT — COGNITIVE AND FUNCTIONAL STATUS - GENERAL
CLIMB 3 TO 5 STEPS WITH RAILING: 4 - NONE
MOVING TO AND FROM BED TO CHAIR: 4 - NONE
STANDING UP FROM CHAIR USING ARMS: 4 - NONE
WALKING IN HOSPITAL ROOM: 4 - NONE

## 2024-11-25 NOTE — ANESTHESIOLOGIST PRE-PROCEDURE ATTESTATION
Pre-Procedure Patient Identification:  I am the Primary Anesthesiologist and have identified the patient on 11/25/24 at 9:00 AM.   I have confirmed the procedure(s) will be performed by the following surgeon/proceduralist Frankel, Robert A, MD.

## 2024-11-25 NOTE — DISCHARGE INSTRUCTIONS
Ms. Deras,     You presented with vomiting blood. You were seen by the gastroenterology (GI) team and on 11/25 you had an EGD which showed some gastritis and possibly small varices. Please START taking Protonix 40 mg once a day.     On admission, your liver functions were noted to be elevated. Imaging here showed hepatic steatosis (fatty liver). Please follow up with GI to arrange for an ultrasound and for ongoing management/monitoring of your liver function. We recommend you STOP taking Crestor, please discuss with GI when you may resume this medication.     No other changes were made to your medications.     It has been a pleasure taking care of you during your hospital stay. We wish you the best of health moving forward.

## 2024-11-25 NOTE — ANESTHESIA POSTPROCEDURE EVALUATION
Patient: Nimo Deras    Procedure Summary       Date: 11/25/24 Room / Location:  GI 1 / [facility]    Anesthesia Start: 0952 Anesthesia Stop: 1009    Procedure: ENDOSCOPY, ESOPHAGUS (Esophagus) Diagnosis:       Gastrointestinal hemorrhage, unspecified gastrointestinal hemorrhage type      (Gastrointestinal hemorrhage, unspecified gastrointestinal hemorrhage type [K92.2])    Providers: Moise Huerta MD Responsible Provider: Mark Wheeler MD    Anesthesia Type: MAC ASA Status: 2 - Emergent            Anesthesia Type: MAC  PACU Vitals    No data found in the last 10 encounters.           Anesthesia Post Evaluation    Pain management: adequate  Patient location during evaluation: PACU  Patient participation: complete - patient participated  Level of consciousness: awake and alert  Cardiovascular status: acceptable  Airway Patency: adequate  Respiratory status: acceptable  Hydration status: acceptable  Anesthetic complications: no

## 2024-11-25 NOTE — ASSESSMENT & PLAN NOTE
- CT scan shows hepatic steatosis.  - In 2021, the patient had similar laboratory abnormalities in the setting of acute illness  - LDH and haptoglobin wnl    Plan:  Hold statin  F/u with GI to arrange for US as OP

## 2024-11-25 NOTE — ANESTHESIA PREPROCEDURE EVALUATION
Relevant Problems   CARDIOVASCULAR   (+) Hypertension      GASTROINTESTINAL   (+) Gastrointestinal hemorrhage   (+) Upper GI bleed      MUSCULOSKELETAL   (+) Rheumatoid arthritis (CMS/HCC)     ... - The patient began experiencing bright red blood with vomiting on 11/23/2024.  This was preceded by 2 weeks of weight loss, 2 months of coughing, food intolerance, and reported bright red blood per rectum....Nilda Echols DO  Physician  Hospitalist    Anesthesia ROS/MED HX    Anesthesia History    Previous anesthetics  Cardiovascular   hypertension  GI/Hepatic   GI Bleeding  Musculoskeletal   Arthritis  rheumatoid arthritis       Physical Exam    Airway   Mallampati: II   TM distance: >3 FB   Neck ROM: full  Cardiovascular - normal   Rhythm: regular   Rate: normalPulmonary - normal   clear to auscultation  Dental - normal        Anesthesia Plan    Plan: MAC    Technique: MAC      Airway: natural airway / supplemental oxygen     Discussed plan with: attending     Pregnancy status reviewed  2 ASA - emergent  Anesthetic plan and risks discussed with: patient  Induction:    intravenous   Postop Plan:   Patient Disposition: inpatient floor planned admission   Pain Management: IV analgesics  Comments:    Plan:  GA Backup

## 2024-11-25 NOTE — ASSESSMENT & PLAN NOTE
- CT scan shows hepatic steatosis.  - In 2021, the patient had similar laboratory abnormalities in the setting of acute illness  - RUQ US ordered  - GI on board

## 2024-11-25 NOTE — PROGRESS NOTES
Hospital Medicine     Daily Progress Note       SUBJECTIVE   Interval History: Seen and examined at bedside. Says she hasn't had further episodes of hematemesis since the ED. Complains of right sided abdominal pain but says this is chronic in the setting of IBS, and only slightly worse than baseline. Just had a couple sips of clear liquids at the time of my evaluation and tolerating it so far without nausea or emesis.    OBJECTIVE      Vital signs in last 24 hours:  Temp:  [36.7 °C (98.1 °F)-36.9 °C (98.4 °F)] 36.8 °C (98.3 °F)  Heart Rate:  [65-74] 71  Resp:  [16-18] 17  BP: (108-143)/(51-65) 118/58  No intake or output data in the 24 hours ending 11/24/24 2138    PHYSICAL EXAMINATION      Physical Exam  Constitutional:       Appearance: Normal appearance.   HENT:      Head: Normocephalic.      Nose: Nose normal.      Mouth/Throat:      Mouth: Mucous membranes are moist.   Eyes:      Extraocular Movements: Extraocular movements intact.      Pupils: Pupils are equal, round, and reactive to light.   Cardiovascular:      Rate and Rhythm: Normal rate and regular rhythm.   Abdominal:      General: Bowel sounds are normal.      Palpations: Abdomen is soft.   Skin:     General: Skin is warm and dry.   Neurological:      General: No focal deficit present.      Mental Status: She is alert and oriented to person, place, and time. Mental status is at baseline.            LINES, CATHETERS, DRAINS, AIRWAYS, AND WOUNDS   Lines, Drains, and Airways:  Wounds (agree with documentation and present on admission):  Peripheral IV (Adult) 11/23/24 Anterior;Left;Proximal Forearm (Active)   Number of days: 1         Comments:    LABS / IMAGING / TELE      Labs  Lab Results   Component Value Date    WBC 7.48 11/24/2024    HGB 12.0 11/24/2024    HCT 34.8 (L) 11/24/2024    .5 (H) 11/24/2024    PLT 81 (L) 11/24/2024     Lab Results   Component Value Date    GLUCOSE 97 11/24/2024    CALCIUM 8.9 11/24/2024     (L) 11/24/2024     K 2.8 (LL) 11/24/2024    CO2 27 11/24/2024    CL 96 (L) 11/24/2024    BUN 12 11/24/2024    CREATININE 0.8 11/24/2024           Imaging  CT ANGIOGRAPHY CHEST PE / CT ABDOMEN PELVIS WITH IV CONTRAST   Final Result   IMPRESSION:      1.  No pulmonary embolism.      2.  No acute inflammatory process in the chest, abdomen, or pelvis.      3.  Hepatomegaly and hepatic steatosis.                  ASSESSMENT AND PLAN        Assessment & Plan  Upper GI bleed  - The patient began experiencing bright red blood with vomiting on 11/23/2024.  This was preceded by 2 weeks of weight loss, 2 months of coughing, food intolerance, and reported bright red blood per rectum.  - Gastric contents positive for blood  - GI on board, appreciate recs  - Pantoprazole 40 mg IV every 12 hours.  - NPO after midnight.  - Monitor CBC and BMP closely  Elevated bilirubin  - CT scan shows hepatic steatosis.  - In 2021, the patient had similar laboratory abnormalities in the setting of acute illness  - RUQ US ordered  - GI on board  Hypertension  - Hold home regimen of metoprolol while NPO  - BP has been labile  Hypercholesterolemia  - Hold home regimen of rosuvastatin while NPO.  Rheumatoid arthritis (CMS/HCC)  - Chronic, currently not on medical therapy.  - Symptom control as needed.  Macrocytosis  - MCV elevated at 105.1.  - Iron panel and vitamin B12 levels pending.  - Follow on CBC    VTE Assessment: Padua VTE Score: 0  VTE Prophylaxis:  Current anticoagulants:    None      Code Status: Full Code      Estimated Discharge Date: 11/26/2024   Disposition Planning: Pending clinical improvement; monitor CBC, plan for EGD tomorrow     Nilda Echols,   11/24/2024

## 2024-11-25 NOTE — ASSESSMENT & PLAN NOTE
- MCV elevated at 105.1  - Vit B12 696; ferritin 1416, iron sat 69%, TIBC 185  -suspect AoCD 2/2 RA

## 2024-11-25 NOTE — PLAN OF CARE
Problem: Adult Inpatient Plan of Care  Goal: Plan of Care Review  Outcome: Progressing  Flowsheets (Taken 11/25/2024 0206)  Progress: improving  Outcome Evaluation: Assumed care at 1930. Patient is oriented, cooperative, and pleasant. Now on clear liquid diet, NPO at midnight for EGD tomorrow. Ambulates indepdendently. Repeat labs completed at start of shift and electrolytes replaced as needed. No emesis or BM during shift. Vitals stable and SR on tele monitor. Signed off on patient at 2300.   Plan of Care Reviewed With: patient

## 2024-11-25 NOTE — OP NOTE
_______________________________________________________________________________  Patient Name: Nimo Alcala      Procedure Date: 11/25/2024 9:44 AM  MRN: [id number]                     Account Number: [id number]  YOB: 1980              Age: 44  Gender: Female                        Note Status: Finalized  Attending MD: ARCHANA FERMIN,  _______________________________________________________________________________  Procedure:             Upper GI endoscopy  Indications:           Hematemesis  Providers:             ARCHANA WHITE (Doctor)  Referring MD:  Requesting Provider:  Medicines:             Monitored Anesthesia Care  Complications:         No immediate complications.  _______________________________________________________________________________  Procedure:             After obtaining informed consent, the endoscope was  passed under direct vision. Throughout the procedure,  the patient's blood pressure, pulse, and oxygen  saturations were monitored continuously. The Endoscope  was introduced through the mouth, and advanced to the  third part of duodenum. The upper GI endoscopy was  accomplished without difficulty. The patient tolerated  the procedure well.  Estimated Blood Loss:  Estimated blood loss: none.  Findings:  The Z-line was regular and was found 40 cm from the incisors.  A small hiatal hernia was present.  Grade I varices were found in the lower third of the esophagus.  Mild inflammation was found in the gastric antrum. Biopsies were taken  with a cold forceps for Helicobacter pylori testing.  The cardia and gastric fundus were normal on retroflexion.  The examined duodenum was normal.  The exam was otherwise without abnormality.  Impression:            - Z-line regular, 40 cm from the incisors.  - Small hiatal hernia.  - Possible small non bleeding esophageal varices.  - Gastritis. Biopsied for H. Pylori testing.  - Normal examined  duodenum.  - The examination was otherwise normal.  Recommendation:        - Return patient to hospital [facility] for possible  discharge same day.  - Await pathology results.  - Use Protonix (pantoprazole) 40 mg PO daily.  - Outpatient follow up with gastroenterology  recommended  - Resume regular diet.  Procedure Code(s):     --- Professional ---  18355, Esophagogastroduodenoscopy, flexible,  transoral; with biopsy, single or multiple  Diagnosis Code(s):     --- Professional ---  K44.9, Diaphragmatic hernia without obstruction or  gangrene  I85.00, Esophageal varices without bleeding  K29.70, Gastritis, unspecified, without bleeding  K92.0, Hematemesis  CPT copyright 2023 [employer]. All rights reserved.  The codes documented in this report are preliminary and upon  review may  be revised to meet current compliance requirements.  _____________________________  RENETTA NELSON MD~LESLIE  11/25/2024 10:13:27 AM  This report has been signed electronically.  Number of Addenda: 0  Note Initiated On: 11/25/2024 9:44 AM

## 2024-11-25 NOTE — PLAN OF CARE
Care Coordination Admission Assessment Note    General Information:  Readmission Within the last 30 days: no previous admission in last 30 days  Does patient have a :    Patient-Specific Goals (include timeframe): Pt wishes to return home at d/c.    Living Arrangements:  Arrived From: home  Current Living Arrangements: apartment  People in Home: alone  Home Accessibility:    Living Arrangement Comments: Pt resides alone in an apt.    Housing Stability and Utility Access (SDOH):  In the last 12 months, was there a time when you were not able to pay the mortgage or rent on time?: No  In the past 12 months, how many times have you moved?:    At any time in the past 12 months, were you homeless or living in a shelter (including now)?: No  In the past 12 months has the electric, gas, oil, or water company threatened to shut off services in your home?: No    Functional Status Prior to Admission:   Assistive Device/Animal Currently Used at Home: none  Functional Status Comments: Indepedent  IADL Comments: Independent     Supports and Services:  Current Outpatient/Agency/Support Group: none  Type of Current Home Care Services:    History of home care episode or rehab stay:      Discharge Needs Assessment:   Concerns to be Addressed: no discharge needs identified  Current Discharge Risk:    Anticipated Changes Related to Illness: none    Patient/Family Anticipated Discharge Plan:  Patient/Family Anticipates Transition To: home  Patient/Family Anticipated Services at Transition: none    Connection to Community  Not applicable    Patient Choice:   Offered/Gave Vendor List: no  Patient's Choice of Community Agency(s):         Anticipated Discharge Plan:  Met with patient. Provided education and contact information for Care Coordination services.: yes  Anticipated Discharge Disposition:       Transportation Needs (SDOH):  Transportation Concerns: none  Transportation Anticipated:    Is Out of Hospital DNR needed  at discharge?: no    In the past 12 months, has lack of transportation kept you from medical appointments or from getting medications?: No  In the past 12 months, has lack of transportation kept you from meetings, work, or from getting things needed for daily living?: No    Concerns - comments:  Pt resides alone in an apartment, fully independent PTA.  No needs at d/c.  Pt to transition back home today.

## 2024-11-25 NOTE — CONSULTS
Clinical Nutrition Note    Clinical Comments:  Assessment complete, secondary to MST score >/=2. Pt is 45 yo female adm from home with UGIB. Pmhx includes HTN, HLD, RA. Pt seen this am after EGD. Pt was eating [place] toast at visit, only a few bites so far, but feeling OK. Per pt, she had 2 weeks of decreased po intake and wt loss PTA. Per pt report, # 2 weeks ago, adm wt (11/23) 194#, reflects 8% wt loss x 2 weeks per pt report. Which is significant for timeframe. Unable to confirm wt hx in EMR. Pt reports hx of GI intolerance and IBS, avoids onion/garlic and legumes d/t GI discomfort. Pt s/p EGD this am showing Mild gastritis and Possible small varices. Pt for d/c home today. Encouraged small/frequent meals and protein intake. Encouraged outpatient RD visit with pt, provided recommendations. Attached IBS diet recs to d/c paperwork per pt request. Meds/labs reviewed. Nutrition will continue to follow if d/c plans change.       Discussed with: patient                            Verbalizes understanding    AYSE Sanchez

## 2024-11-25 NOTE — NURSING NOTE
Discharge instructions reviewed with patient and she verbalized understanding. Peripheral IV removed. Tele monitor returned to monitor room.

## 2024-11-25 NOTE — DISCHARGE SUMMARY
[facility] Medicine    Inpatient Discharge Summary        BRIEF OVERVIEW     Patient: Nimo Deras  Admission Date: 2024   : 1980 Discharge Date: 2024       PCP: Tabitha Sánchez MD  Disposition:      Destination:       Attending Provider: Rachel Long MD Attending phys phone: [phone]    Code Status At Discharge: Full Code        ASSESSMENT AND PLAN     Assessment & Plan  Upper GI bleed  -p/w hematemesis   -CTA C/A/P showed hepatomegaly and hepatic steatosis, otherwise unremarkable  -s/p EGD  showed mild gastritis. Possible small varices.   -source of bleed unclear    Plan:  Started on PPI 40 mg qD  Will need to f/u with GI  Elevated bilirubin  - CT scan shows hepatic steatosis.  - In , the patient had similar laboratory abnormalities in the setting of acute illness  - LDH and haptoglobin wnl    Plan:  Hold statin  F/u with GI to arrange for US as OP  Hypertension  - continue Toprol 50 mg qD  Hypercholesterolemia  - hold statin iso elevated LFTs  Rheumatoid arthritis (CMS/HCC)  - Currently not on medical therapy.  Macrocytosis  - MCV elevated at 105.1  - Vit B12 696; ferritin 1416, iron sat 69%, TIBC 185  -suspect AoCD 2/2 RA      Brief Hospital Course  Nimo Deras is a 44 y.o. female with a past medical history of hypertension, hypercholesterolemia, and rheumatoid arthritis who presents to the hospital for the evaluation of hematemesis.     In ED patient was mildly hypertensive at 156/89.  Laboratory studies were notable for mild hyponatremia, AST of 121, alkaline phosphatase of 139, total bilirubin of 3.9, MCV of 105.1, and positive gastric occult blood. CT scans were performed and did not show any pulmonary emboli.  No inflammatory process was noted in the chest, abdomen, or pelvis, however, hepatomegaly and hepatic steatosis were noted.     Patient presented with hematemesis. She was evaluated by GI and on  underwent EGD which showed mild gastritis. Possible  small varices. Etiology of bleeding unclear. Hgb remained stable. She was started on PPI and will need to follow up with GI.     Of note, she was found to have elevated bilirubin that is indirect along with thrombocytopenia, hypokalemia, elevated ferritin. CT A/P showed hepatic steatosis and hepatomegaly. LDH and haptoglobin wnl. She will need to f/u with GI to arrange for OP RUQ ultrasound and ongoing monitoring of LFT. Her statin was held at discharge iso elevated LFT, she will need to d/w GI if/when to resume.     Please refer to problem list above for details on multiple problems.     On day of discharge patient was feeling well. She was considered HDS and discharged home.     Exam on Day of Discharge  Temp:  [36.3 °C (97.3 °F)-37.1 °C (98.8 °F)] 36.3 °C (97.3 °F)  Heart Rate:  [67-96] 72  Resp:  [16-18] 16  BP: (108-143)/(51-65) 128/58    Physical Exam  Constitutional:       General: She is not in acute distress.     Appearance: Normal appearance. She is not ill-appearing or toxic-appearing.   HENT:      Head: Normocephalic.      Nose: Nose normal.      Mouth/Throat:      Mouth: Mucous membranes are moist.   Eyes:      Extraocular Movements: Extraocular movements intact.      Pupils: Pupils are equal, round, and reactive to light.   Cardiovascular:      Rate and Rhythm: Normal rate and regular rhythm.      Pulses: Normal pulses.      Heart sounds: Normal heart sounds. No murmur heard.     No gallop.   Pulmonary:      Effort: Pulmonary effort is normal.      Breath sounds: Normal breath sounds. No wheezing, rhonchi or rales.   Abdominal:      General: Bowel sounds are normal. There is no distension.      Palpations: Abdomen is soft.      Tenderness: There is no abdominal tenderness.   Musculoskeletal:         General: Normal range of motion.      Right lower leg: No edema.      Left lower leg: No edema.   Skin:     General: Skin is warm and dry.   Neurological:      Mental Status: She is alert and oriented to  "person, place, and time. Mental status is at baseline.         DISCHARGE MEDICATIONS         Medication List        START taking these medications      pantoprazole 40 mg EC tablet  Commonly known as: PROTONIX  Start taking on: November 26, 2024  Take 1 tablet (40 mg total) by mouth daily before breakfast for 90 doses Indications: gastroesophageal reflux disease.  Dose: 40 mg            CONTINUE taking these medications      cetirizine 10 mg tablet  Commonly known as: ZyrTEC  Take 10 mg by mouth daily.  Dose: 10 mg     fluticasone propionate 50 mcg/actuation nasal spray  Commonly known as: FLONASE  Administer 1 spray into each nostril daily as needed for rhinitis or allergies.  Dose: 1 spray     levonorgestreL 18.6 mcg/24 hours (5 yrs) 52 mg intrauterine device  Commonly known as: MIRENA  No SIG Entered     LORazepam 2 mg tablet  Commonly known as: ATIVAN  Take 2 mg by mouth every 8 (eight) hours as needed for anxiety.  Dose: 2 mg     metoprolol succinate XL 50 mg 24 hr tablet  Commonly known as: TOPROL-XL  Take 50 mg by mouth once daily.  Dose: 50 mg            STOP taking these medications      rosuvastatin 20 mg tablet  Commonly known as: CRESTOR             INSTRUCTIONS AND FOLLOW-UP     Instructions for after discharge       Call provider for:  difficulty breathing      Call provider for:  extreme fatigue      Call provider for:  persistent dizziness or light-headedness, headache, or visual disturbances      Call provider for:  persistent nausea or vomiting      Call provider for:  rash      Call provider for:  severe uncontrolled pain      Call provider for:  temperature >100.4      Discharge Diet      Diet Type / Texture: Regular    Fluid Consistency: Thin Liquids    Review additional activity direction in \"instructions\" section              Important Issues to Address in Follow-Up  Discharge Follow-up Issues: Please consider the following outpatient imaging studies: Ultrasound of the abdomen     Scheduled " Appointments  Encounter Information    This patient does not currently have any appointments scheduled.         Recommended Follow-up Visits  Follow-Up After Discharge       Tabitha Sánchez MD   Specialty: Family Medicine   Relationship: PCP - General        Follow up in 1 week(s)    Service: for after hospital visit    Moise Huerta MD   Specialty: Gastroenterology, Internal Medicine        Follow up in 1 week(s)    Service: To follow up for gastritis and ongoing workup for elevated liver function.            Test Results Pending at Discharge  Pending Inpatient Labs       Order Current Status    Pathology Tissue Exam In process            LABS AND IMAGING   Pertinent Labs  Results from last 7 days   Lab Units 11/25/24 0325 11/24/24 2130 11/24/24 0411   SODIUM mEQ/L 134* 134* 135*   POTASSIUM mEQ/L 3.5 3.1* 2.8*   CHLORIDE mEQ/L 99 99 96*   CO2 mEQ/L 26 26 27   BUN mg/dL 11 12 12   CREATININE mg/dL 0.9 0.8 0.8   GLUCOSE mg/dL 93 105* 97   CALCIUM mg/dL 9.1 8.7 8.9     Results from last 7 days   Lab Units 11/25/24 0325 11/24/24 2130 11/24/24 0411   WBC K/uL 6.15 5.85 7.48   HEMOGLOBIN g/dL 12.0 11.4* 12.0   HEMATOCRIT % 35.1 33.9* 34.8*   PLATELETS K/uL 63* 70* 81*       Pertinent Imaging  CT ANGIOGRAPHY CHEST PE / CT ABDOMEN PELVIS WITH IV CONTRAST    Result Date: 11/23/2024  IMPRESSION: 1.  No pulmonary embolism. 2.  No acute inflammatory process in the chest, abdomen, or pelvis. 3.  Hepatomegaly and hepatic steatosis.        OTHER SERVICES PROVIDED   Consults During Admission  IP CONSULT TO GASTROENTEROLOGY  IP CONSULT TO ANESTHESIOLOGY    Procedures Performed  None       Thank you for allowing the Division of Hospital Medicine to care for your patient.

## 2024-11-25 NOTE — ASSESSMENT & PLAN NOTE
-p/w hematemesis   -CTA C/A/P showed hepatomegaly and hepatic steatosis, otherwise unremarkable  -s/p EGD 11/25 showed mild gastritis. Possible small varices.   -source of bleed unclear    Plan:  Started on PPI 40 mg qD  Will need to f/u with GI

## 2024-11-25 NOTE — PROGRESS NOTES
See upper endoscopy report.  Mild gastritis. Possible small varices, suspect on the basis of hepatomegaly, and not the bleeding source. Started her on pantoprazole 40 mg daily. Regular diet. She may be discharged home to follow-up with gastroenterology.  Will sign off, please call if needed.  Discussed with HMS

## 2024-11-25 NOTE — NURSING NOTE
Gauze bere Dressing removed by LIZZ Leblanc to assess. Dressing removed and cleaned of old blood and clot. Flushed with NSS after good blood return noted

## 2024-11-25 NOTE — ASSESSMENT & PLAN NOTE
- The patient began experiencing bright red blood with vomiting on 11/23/2024.  This was preceded by 2 weeks of weight loss, 2 months of coughing, food intolerance, and reported bright red blood per rectum.  - Gastric contents positive for blood  - GI on board, appreciate recs  - Pantoprazole 40 mg IV every 12 hours.  - NPO after midnight.  - Monitor CBC and BMP closely

## 2024-11-27 LAB
CASE RPRT: NORMAL
CLINICAL INFO: NORMAL
PATH REPORT.ADDENDUM SPEC: NORMAL
PATH REPORT.FINAL DX SPEC: NORMAL
PATH REPORT.GROSS SPEC: NORMAL

## 2024-11-29 ENCOUNTER — HOSPITAL ENCOUNTER (OUTPATIENT)
Dept: RADIOLOGY | Facility: CLINIC | Age: 44
Discharge: HOME | End: 2024-11-29
Attending: STUDENT IN AN ORGANIZED HEALTH CARE EDUCATION/TRAINING PROGRAM
Payer: COMMERCIAL

## 2024-11-29 DIAGNOSIS — R17 UNSPECIFIED JAUNDICE: ICD-10-CM

## 2024-11-29 DIAGNOSIS — R74.01 ELEVATION OF LEVELS OF LIVER TRANSAMINASE LEVELS: ICD-10-CM

## 2024-11-29 DIAGNOSIS — R10.11 RIGHT UPPER QUADRANT PAIN: ICD-10-CM

## 2024-11-29 DIAGNOSIS — K75.9 INFLAMMATORY LIVER DISEASE, UNSPECIFIED: ICD-10-CM

## 2024-11-29 PROCEDURE — 76705 ECHO EXAM OF ABDOMEN: CPT

## 2025-02-06 ENCOUNTER — TRANSCRIBE ORDERS (OUTPATIENT)
Dept: SCHEDULING | Age: 45
End: 2025-02-06
Payer: COMMERCIAL

## 2025-02-06 DIAGNOSIS — K76.0 HEPATIC STEATOSIS: ICD-10-CM

## 2025-02-06 DIAGNOSIS — R94.5 ABNORMAL LIVER FUNCTION: ICD-10-CM

## 2025-02-06 DIAGNOSIS — R93.2 ABNORMAL LIVER SCAN: Primary | ICD-10-CM

## 2025-02-11 ENCOUNTER — HOSPITAL ENCOUNTER (OUTPATIENT)
Dept: RADIOLOGY | Facility: HOSPITAL | Age: 45
Discharge: HOME | End: 2025-02-11
Attending: INTERNAL MEDICINE | Admitting: RADIOLOGY
Payer: COMMERCIAL

## 2025-02-11 VITALS
OXYGEN SATURATION: 97 % | RESPIRATION RATE: 18 BRPM | DIASTOLIC BLOOD PRESSURE: 60 MMHG | BODY MASS INDEX: 27.2 KG/M2 | HEART RATE: 62 BPM | WEIGHT: 190 LBS | SYSTOLIC BLOOD PRESSURE: 121 MMHG | HEIGHT: 70 IN | TEMPERATURE: 97 F

## 2025-02-11 DIAGNOSIS — R94.5 ABNORMAL LIVER FUNCTION: ICD-10-CM

## 2025-02-11 DIAGNOSIS — K76.0 HEPATIC STEATOSIS: ICD-10-CM

## 2025-02-11 DIAGNOSIS — R93.2 ABNORMAL LIVER SCAN: ICD-10-CM

## 2025-02-11 LAB
BASOPHILS # BLD: 0.09 K/UL (ref 0.01–0.1)
BASOPHILS NFR BLD: 1.6 %
DIFFERENTIAL METHOD BLD: ABNORMAL
EOSINOPHIL # BLD: 0.16 K/UL (ref 0.04–0.36)
EOSINOPHIL NFR BLD: 2.9 %
ERYTHROCYTE [DISTWIDTH] IN BLOOD BY AUTOMATED COUNT: 14.4 % (ref 11.7–14.4)
HCT VFR BLD AUTO: 37.5 % (ref 35–45)
HGB BLD-MCNC: 12.8 G/DL (ref 11.8–15.7)
IMM GRANULOCYTES # BLD AUTO: 0.01 K/UL (ref 0–0.08)
IMM GRANULOCYTES NFR BLD AUTO: 0.2 %
INR PPP: 1.5
LYMPHOCYTES # BLD: 2.34 K/UL (ref 1.2–3.5)
LYMPHOCYTES NFR BLD: 42 %
MCH RBC QN AUTO: 35.4 PG (ref 28–33.2)
MCHC RBC AUTO-ENTMCNC: 34.1 G/DL (ref 32.2–35.5)
MCV RBC AUTO: 103.6 FL (ref 83–98)
MONOCYTES # BLD: 0.58 K/UL (ref 0.28–0.8)
MONOCYTES NFR BLD: 10.4 %
NEUTROPHILS # BLD: 2.39 K/UL (ref 1.7–7)
NEUTS SEG NFR BLD: 42.9 %
NRBC BLD-RTO: 0 %
PLATELET # BLD AUTO: 144 K/UL (ref 150–369)
PMV BLD AUTO: 10.6 FL (ref 9.4–12.3)
PROTHROMBIN TIME: 17.5 SEC (ref 12.2–14.5)
RBC # BLD AUTO: 3.62 M/UL (ref 3.93–5.22)
WBC # BLD AUTO: 5.57 K/UL (ref 3.8–10.5)

## 2025-02-11 PROCEDURE — 71000001 HC PACU PHASE 1 INITIAL 30MIN

## 2025-02-11 PROCEDURE — 25000000 HC PHARMACY GENERAL: Performed by: RADIOLOGY

## 2025-02-11 PROCEDURE — 85025 COMPLETE CBC W/AUTO DIFF WBC: CPT | Performed by: NURSE PRACTITIONER

## 2025-02-11 PROCEDURE — 0FB13ZX EXCISION OF RIGHT LOBE LIVER, PERCUTANEOUS APPROACH, DIAGNOSTIC: ICD-10-PCS | Performed by: RADIOLOGY

## 2025-02-11 PROCEDURE — 85610 PROTHROMBIN TIME: CPT | Performed by: NURSE PRACTITIONER

## 2025-02-11 PROCEDURE — 12000000 HC ROOM AND CARE MED/SURG

## 2025-02-11 PROCEDURE — 88313 SPECIAL STAINS GROUP 2: CPT | Performed by: INTERNAL MEDICINE

## 2025-02-11 PROCEDURE — 36415 COLL VENOUS BLD VENIPUNCTURE: CPT | Performed by: NURSE PRACTITIONER

## 2025-02-11 PROCEDURE — 63600000 HC DRUGS/DETAIL CODE: Mod: JZ | Performed by: RADIOLOGY

## 2025-02-11 PROCEDURE — 71000011 HC PACU PHASE 1 EA ADDL MIN

## 2025-02-11 PROCEDURE — 76942 ECHO GUIDE FOR BIOPSY: CPT

## 2025-02-11 PROCEDURE — BF45ZZZ ULTRASONOGRAPHY OF LIVER: ICD-10-PCS | Performed by: RADIOLOGY

## 2025-02-11 RX ORDER — LIDOCAINE HYDROCHLORIDE 10 MG/ML
INJECTION, SOLUTION INFILTRATION; PERINEURAL
Status: COMPLETED | OUTPATIENT
Start: 2025-02-11 | End: 2025-02-11

## 2025-02-11 RX ORDER — FENTANYL CITRATE 50 UG/ML
INJECTION, SOLUTION INTRAMUSCULAR; INTRAVENOUS
Status: COMPLETED | OUTPATIENT
Start: 2025-02-11 | End: 2025-02-11

## 2025-02-11 RX ADMIN — LIDOCAINE HYDROCHLORIDE 10 ML: 10 INJECTION, SOLUTION INFILTRATION; PERINEURAL at 08:40

## 2025-02-11 RX ADMIN — FENTANYL CITRATE 50 MCG: 50 INJECTION, SOLUTION INTRAMUSCULAR; INTRAVENOUS at 08:40

## 2025-02-11 NOTE — POST-PROCEDURE NOTE
Interventional Radiology Brief Postprocedure Note    Nimo Deras     Attending: Kan Ramirez MD     Assistant:      Diagnosis: Elevated liver function tests    Description of procedure: u/s guided liver bx    Contrast:       Anesthesia:  Local (Lidocaine)    Volume of Lidocaine Utilized (ml): 5     Medications:   Fentanyl 50 mcg IV    Complications: None      Estimated Blood Loss: Estimated Blood Loss: 0-10 ml    Anticoagulation:      Specimens:  18G x 3.3 cm core - right lobe liver    Findings: Successful u/s guided liver biopsy, right lobe    2/11/2025 8:56 AM

## 2025-02-11 NOTE — H&P
Inpatient History & Physical     Admitting Diagnosis: Abnormal liver scan [R93.2]  Abnormal liver function [R94.5]  Hepatic steatosis [K76.0]    HPI  Patient is a 45 y.o. female presents for u/s guided liver biopsy    Medical History:   Past Medical History:   Diagnosis Date    Hypertension     Lipid disorder     Seasonal allergies        Surgical History:   Past Surgical History   Procedure Laterality Date    Colonoscopy      ENDOSCOPY, ESOPHAGUS N/A 11/25/2024    Performed by Moise Huerta MD at  GI    Esophagoscopy / egd      Sinus surgery      [place] tooth extraction         Social History     Socioeconomic History    Marital status: Single   Tobacco Use    Smoking status: Former    Smokeless tobacco: Never   Substance and Sexual Activity    Alcohol use: Not Currently     Comment: quit last week prior daily    Drug use: Not Currently    Sexual activity: Defer     Social Drivers of Health     Food Insecurity: No Food Insecurity (11/23/2024)    Hunger Vital Sign     Worried About Running Out of Food in the Last Year: Never true     Ran Out of Food in the Last Year: Never true   Transportation Needs: No Transportation Needs (11/25/2024)    PRAPARE - Transportation     Lack of Transportation (Medical): No     Lack of Transportation (Non-Medical): No   Housing Stability: Unknown (11/25/2024)    Housing Stability Vital Sign     Unable to Pay for Housing in the Last Year: No     Homeless in the Last Year: No       Family History:No family history on file.    Allergies: Patient has no known allergies.    Current Medications[1]    Review of Systems  All other systems reviewed and negative except as noted in the HPI.    Objective     Vital Signs for the last 24 hours:  Temp:  [36.1 °C (97 °F)] 36.1 °C (97 °F)  Heart Rate:  [72] 72  Resp:  [16] 16  BP: (119)/(60) 119/60    General appearance: alert, appears stated age, and cooperative  Abdomen: soft, non-tender    Labs   I have reviewed the patient's labs to the  time of note. No new clinical concern.    Imaging  I have independently reviewed the pertinent imaging from the last 24 hrs.      Assessment/Plan     Proceed with u/s guided liver biopsy           [1]   No current facility-administered medications for this encounter.

## 2025-02-11 NOTE — DISCHARGE INSTRUCTIONS
Liver Biopsy    DISCHARGE INSTRUCTIONS  You have had a biopsy of your liver.  You were given a narcotic pain medication.   You may resume your normal diet.  You may resume your normal medications.   Do not drive, operate heavy machinery, engage in heavy lifting or strenuous activity or drink any alcoholic beverages for the next 24 hours.  Limit lifting objects > 15 lbs for the next 48 hours  You may resume normal activity in 48 hours, as tolerated.  Keep the area covered and dry for the next 24 hours.     It is ok to shower. However, you should avoid bathing for the next 48 hours. If the dressing gets wet, please remove it and replace with a bandage.    It is normal to experience some pain at the site over the next few days. You may take Tylenol for that pain    Notify your primary physician and/or Interventional Radiologist IMMEDIATELY if any of the following occur:  · Fever of 101° F (38 ° C) or chills  · Swelling and or redness in the area of the puncture site  · New onset of sharp or diffuse abdominal pain  · Lightheadedness or dizziness upon standing    Physician Contact Information  If you have a problem that you believe requires immediate attention, you should go to the Emergency Room, either at [facility] or the closest hospital. If you believe that your problem can safely wait until you speak to a physician, you should contact your doctor or Interventional Radiologist.   It is usually easier to contact your own physician by calling the office, or after hours through the answering service. If you do not know the number, the hospital  can connect you by calling [phone].   If you have concerns that need to be answered by the Interventional Radiologist, you can reach him/ her as follows:   During regular weekday hours (8AM to 5PM), call [phone] and ask the technologist to connect you with the Interventional Radiologist.   During off hours for emergencies call [phone] and ask the  hospital  to contact the Interventional Radiologist on-call.      [facility] strongly recommends that you visit a Primary Care Provider (PCP) regularly. Your PCP can help you implement the recommendations we gave you today, coordinate care among your specialists, as well as make sure you are up to date with wellness exams, immunizations and preventive screenings. Your PCP can also help when you are feeling sick, potentially avoiding the need for urgent care or emergency department visits. For these reasons, it is important that you follow up with your PCP at least annually or more often based upon your medical conditions. If you do not have a PCP, please call [phone] ([phone]) or go to Find a Doctor for help with finding one.

## 2025-02-11 NOTE — OR SURGEON
Pre-Procedure patient identification:  I am the primary operating surgeon/proceduralist and I have reviewed the applicable pathology reports and radiology studies for this procedure. I have identified the patient on 02/11/25 at 8:15 AM Kan Ramirez MD

## 2025-02-18 LAB
CASE RPRT: NORMAL
CLINICAL INFO: NORMAL
PATH REPORT.FINAL DX SPEC: NORMAL
PATH REPORT.GROSS SPEC: NORMAL
PATH REPORT.MICROSCOPIC SPEC OTHER STN: NORMAL

## 2025-02-21 ENCOUNTER — TRANSCRIBE ORDERS (OUTPATIENT)
Dept: SCHEDULING | Age: 45
End: 2025-02-21

## 2025-03-07 ENCOUNTER — HOSPITAL ENCOUNTER (OUTPATIENT)
Facility: HOSPITAL | Age: 45
End: 2025-03-07
Attending: INTERNAL MEDICINE | Admitting: INTERNAL MEDICINE
Payer: COMMERCIAL

## 2025-08-18 ENCOUNTER — ANESTHESIA EVENT (INPATIENT)
Dept: OPERATING ROOM | Facility: HOSPITAL | Age: 45
DRG: 299 | End: 2025-08-18
Payer: COMMERCIAL

## 2025-08-18 ENCOUNTER — APPOINTMENT (INPATIENT)
Dept: RADIOLOGY | Facility: HOSPITAL | Age: 45
DRG: 432 | End: 2025-08-18
Attending: NURSE PRACTITIONER
Payer: COMMERCIAL

## 2025-08-18 ENCOUNTER — ANESTHESIA (INPATIENT)
Dept: OPERATING ROOM | Facility: HOSPITAL | Age: 45
DRG: 299 | End: 2025-08-18
Payer: COMMERCIAL

## 2025-08-18 ENCOUNTER — HOSPITAL ENCOUNTER (INPATIENT)
Facility: HOSPITAL | Age: 45
LOS: 1 days | Discharge: ANOTHER HOSPITAL | DRG: 299 | End: 2025-08-18
Attending: STUDENT IN AN ORGANIZED HEALTH CARE EDUCATION/TRAINING PROGRAM | Admitting: INTERNAL MEDICINE
Payer: COMMERCIAL

## 2025-08-18 ENCOUNTER — APPOINTMENT (INPATIENT)
Dept: RADIOLOGY | Facility: HOSPITAL | Age: 45
DRG: 299 | End: 2025-08-18
Attending: INTERNAL MEDICINE
Payer: COMMERCIAL

## 2025-08-18 ENCOUNTER — APPOINTMENT (INPATIENT)
Dept: RADIOLOGY | Facility: HOSPITAL | Age: 45
DRG: 432 | End: 2025-08-18
Attending: INTERNAL MEDICINE
Payer: COMMERCIAL

## 2025-08-18 ENCOUNTER — HOSPITAL ENCOUNTER (INPATIENT)
Facility: HOSPITAL | Age: 45
LOS: 4 days | Discharge: ANOTHER HOSPITAL | DRG: 432 | End: 2025-08-22
Attending: STUDENT IN AN ORGANIZED HEALTH CARE EDUCATION/TRAINING PROGRAM | Admitting: STUDENT IN AN ORGANIZED HEALTH CARE EDUCATION/TRAINING PROGRAM
Payer: COMMERCIAL

## 2025-08-18 ENCOUNTER — APPOINTMENT (INPATIENT)
Dept: RADIOLOGY | Facility: HOSPITAL | Age: 45
DRG: 299 | End: 2025-08-18
Attending: STUDENT IN AN ORGANIZED HEALTH CARE EDUCATION/TRAINING PROGRAM
Payer: COMMERCIAL

## 2025-08-18 PROBLEM — N17.9 AKI (ACUTE KIDNEY INJURY) (CMS/HCC): Status: ACTIVE | Noted: 2025-08-18

## 2025-08-18 PROBLEM — D69.6 THROMBOCYTOPENIA (CMS/HCC): Status: ACTIVE | Noted: 2025-08-18

## 2025-08-18 PROBLEM — I85.11 ESOPHAGEAL VARICES WITH BLEEDING IN DISEASES CLASSIFIED ELSEWHERE (CMS/HCC): Status: ACTIVE | Noted: 2025-08-18

## 2025-08-18 PROBLEM — K72.90 DECOMPENSATED CIRRHOSIS (CMS/HCC): Status: ACTIVE | Noted: 2025-08-18

## 2025-08-18 PROBLEM — Z99.11 VENTILATOR DEPENDENCE (CMS/HCC): Status: ACTIVE | Noted: 2025-08-18

## 2025-08-18 PROBLEM — K74.60 DECOMPENSATED CIRRHOSIS (CMS/HCC): Status: ACTIVE | Noted: 2025-08-18

## 2025-08-18 PROBLEM — F10.90 ALCOHOL USE DISORDER: Status: ACTIVE | Noted: 2025-08-18

## 2025-08-18 PROBLEM — R57.9 SHOCK (CMS/HCC): Status: ACTIVE | Noted: 2025-08-18

## 2025-08-18 LAB
ISBT CODE: 9500
ISBT CODE: 9500
PRODUCT CODE: NORMAL
PRODUCT CODE: NORMAL
PRODUCT STATUS: NORMAL
PRODUCT STATUS: NORMAL
SPECIMEN EXP DATE BLD: NORMAL
SPECIMEN EXP DATE BLD: NORMAL
UNIT ABO: NORMAL
UNIT ABO: NORMAL
UNIT ID: NORMAL
UNIT ID: NORMAL
UNIT RH: NEGATIVE
UNIT RH: NEGATIVE

## 2025-08-18 PROCEDURE — 25800000 HC PHARMACY IV SOLUTIONS: Performed by: NURSE ANESTHETIST, CERTIFIED REGISTERED

## 2025-08-18 PROCEDURE — 63600000 HC DRUGS/DETAIL CODE: Performed by: NURSE ANESTHETIST, CERTIFIED REGISTERED

## 2025-08-18 RX ORDER — PROPOFOL 10 MG/ML
INJECTION, EMULSION INTRAVENOUS AS NEEDED
Status: DISCONTINUED | OUTPATIENT
Start: 2025-08-18 | End: 2025-08-18 | Stop reason: SURG

## 2025-08-18 RX ORDER — SODIUM CHLORIDE, SODIUM GLUCONATE, SODIUM ACETATE, POTASSIUM CHLORIDE AND MAGNESIUM CHLORIDE 30; 37; 368; 526; 502 MG/100ML; MG/100ML; MG/100ML; MG/100ML; MG/100ML
INJECTION, SOLUTION INTRAVENOUS CONTINUOUS PRN
Status: DISCONTINUED | OUTPATIENT
Start: 2025-08-18 | End: 2025-08-18 | Stop reason: SURG

## 2025-08-18 RX ORDER — ONDANSETRON HYDROCHLORIDE 2 MG/ML
INJECTION, SOLUTION INTRAVENOUS AS NEEDED
Status: DISCONTINUED | OUTPATIENT
Start: 2025-08-18 | End: 2025-08-18 | Stop reason: SURG

## 2025-08-18 RX ORDER — DEXAMETHASONE SODIUM PHOSPHATE 4 MG/ML
INJECTION, SOLUTION INTRA-ARTICULAR; INTRALESIONAL; INTRAMUSCULAR; INTRAVENOUS; SOFT TISSUE AS NEEDED
Status: DISCONTINUED | OUTPATIENT
Start: 2025-08-18 | End: 2025-08-18 | Stop reason: SURG

## 2025-08-18 RX ADMIN — SODIUM CHLORIDE, SODIUM GLUCONATE, SODIUM ACETATE, POTASSIUM CHLORIDE AND MAGNESIUM CHLORIDE: 526; 502; 368; 37; 30 INJECTION, SOLUTION INTRAVENOUS at 03:50

## 2025-08-18 RX ADMIN — DEXAMETHASONE SODIUM PHOSPHATE 4 MG: 4 INJECTION, SOLUTION INTRA-ARTICULAR; INTRALESIONAL; INTRAMUSCULAR; INTRAVENOUS; SOFT TISSUE at 03:57

## 2025-08-18 RX ADMIN — ONDANSETRON 4 MG: 2 INJECTION INTRAMUSCULAR; INTRAVENOUS at 03:57

## 2025-08-18 RX ADMIN — PROPOFOL 80 MCG/KG/MIN: 10 INJECTION, EMULSION INTRAVENOUS at 03:50

## 2025-08-18 RX ADMIN — PROPOFOL 80 MG: 10 INJECTION, EMULSION INTRAVENOUS at 03:54

## 2025-08-18 ASSESSMENT — COGNITIVE AND FUNCTIONAL STATUS - GENERAL
MOVING TO AND FROM BED TO CHAIR: 1 - TOTAL
CLIMB 3 TO 5 STEPS WITH RAILING: 1 - TOTAL
MOVING TO AND FROM BED TO CHAIR: 1 - TOTAL
TOILETING: 1 - TOTAL
MOVING TO AND FROM BED TO CHAIR: 1 - TOTAL
STANDING UP FROM CHAIR USING ARMS: 1 - TOTAL
HELP NEEDED FOR BATHING: 1 - TOTAL
MOVING TO AND FROM BED TO CHAIR: 1 - TOTAL
HELP NEEDED FOR PERSONAL GROOMING: 1 - TOTAL
STANDING UP FROM CHAIR USING ARMS: 1 - TOTAL
WALKING IN HOSPITAL ROOM: 1 - TOTAL
STANDING UP FROM CHAIR USING ARMS: 1 - TOTAL
CLIMB 3 TO 5 STEPS WITH RAILING: 1 - TOTAL
EATING MEALS: 1 - TOTAL
WALKING IN HOSPITAL ROOM: 1 - TOTAL
WALKING IN HOSPITAL ROOM: 1 - TOTAL
DRESSING REGULAR UPPER BODY CLOTHING: 1 - TOTAL
DRESSING REGULAR LOWER BODY CLOTHING: 1 - TOTAL
CLIMB 3 TO 5 STEPS WITH RAILING: 1 - TOTAL
WALKING IN HOSPITAL ROOM: 1 - TOTAL
STANDING UP FROM CHAIR USING ARMS: 1 - TOTAL
CLIMB 3 TO 5 STEPS WITH RAILING: 1 - TOTAL

## 2025-08-19 ENCOUNTER — APPOINTMENT (INPATIENT)
Dept: RADIOLOGY | Facility: HOSPITAL | Age: 45
DRG: 432 | End: 2025-08-19
Attending: INTERNAL MEDICINE
Payer: COMMERCIAL

## 2025-08-19 PROBLEM — K92.0 HEMATEMESIS: Status: ACTIVE | Noted: 2025-08-19

## 2025-08-19 LAB
CROSSMATCH: NORMAL
CROSSMATCH: NORMAL
ISBT CODE: 9500
ISBT CODE: 9500
PRODUCT CODE: NORMAL
PRODUCT CODE: NORMAL
PRODUCT STATUS: NORMAL
PRODUCT STATUS: NORMAL
SPECIMEN EXP DATE BLD: NORMAL
SPECIMEN EXP DATE BLD: NORMAL
UNIT ABO: NORMAL
UNIT ABO: NORMAL
UNIT ID: NORMAL
UNIT ID: NORMAL
UNIT RH: NEGATIVE
UNIT RH: NEGATIVE

## 2025-08-19 ASSESSMENT — COGNITIVE AND FUNCTIONAL STATUS - GENERAL
CLIMB 3 TO 5 STEPS WITH RAILING: 1 - TOTAL
MOVING TO AND FROM BED TO CHAIR: 1 - TOTAL
STANDING UP FROM CHAIR USING ARMS: 1 - TOTAL
WALKING IN HOSPITAL ROOM: 1 - TOTAL

## 2025-08-20 PROBLEM — R57.9 SHOCK (CMS/HCC): Status: RESOLVED | Noted: 2025-08-18 | Resolved: 2025-08-20

## 2025-08-20 PROBLEM — R57.8 HEMORRHAGIC SHOCK (CMS/HCC): Status: ACTIVE | Noted: 2025-08-20

## 2025-08-20 ASSESSMENT — COGNITIVE AND FUNCTIONAL STATUS - GENERAL
STANDING UP FROM CHAIR USING ARMS: 1 - TOTAL
CLIMB 3 TO 5 STEPS WITH RAILING: 1 - TOTAL
CLIMB 3 TO 5 STEPS WITH RAILING: 1 - TOTAL
MOVING TO AND FROM BED TO CHAIR: 1 - TOTAL
WALKING IN HOSPITAL ROOM: 1 - TOTAL
MOVING TO AND FROM BED TO CHAIR: 1 - TOTAL
WALKING IN HOSPITAL ROOM: 1 - TOTAL
STANDING UP FROM CHAIR USING ARMS: 1 - TOTAL

## 2025-08-21 ENCOUNTER — APPOINTMENT (INPATIENT)
Dept: RADIOLOGY | Facility: HOSPITAL | Age: 45
DRG: 432 | End: 2025-08-21
Attending: INTERNAL MEDICINE
Payer: COMMERCIAL

## 2025-08-21 PROBLEM — R65.21 SEPTIC SHOCK (CMS/HCC): Status: ACTIVE | Noted: 2025-08-21

## 2025-08-21 PROBLEM — A41.9 SEPTIC SHOCK (CMS/HCC): Status: ACTIVE | Noted: 2025-08-21

## 2025-08-21 PROBLEM — J18.9 PNEUMONIA: Status: ACTIVE | Noted: 2025-08-21

## 2025-08-21 ASSESSMENT — COGNITIVE AND FUNCTIONAL STATUS - GENERAL
MOVING TO AND FROM BED TO CHAIR: 1 - TOTAL
STANDING UP FROM CHAIR USING ARMS: 1 - TOTAL
MOVING TO AND FROM BED TO CHAIR: 1 - TOTAL
STANDING UP FROM CHAIR USING ARMS: 1 - TOTAL
CLIMB 3 TO 5 STEPS WITH RAILING: 1 - TOTAL
WALKING IN HOSPITAL ROOM: 1 - TOTAL
WALKING IN HOSPITAL ROOM: 1 - TOTAL
CLIMB 3 TO 5 STEPS WITH RAILING: 1 - TOTAL

## 2025-08-22 ASSESSMENT — COGNITIVE AND FUNCTIONAL STATUS - GENERAL
WALKING IN HOSPITAL ROOM: 1 - TOTAL
STANDING UP FROM CHAIR USING ARMS: 1 - TOTAL
MOVING TO AND FROM BED TO CHAIR: 1 - TOTAL
CLIMB 3 TO 5 STEPS WITH RAILING: 1 - TOTAL

## (undated) DEVICE — MOUTHPIECE 60FR ENDO BITEBLOCK

## (undated) DEVICE — FORCEP COLD RADIAL JAW

## (undated) DEVICE — GOWN SIRUS POLYRNF BRTHSLV LG 30/CS

## (undated) DEVICE — CUSTOM PROCEDURE KIT